# Patient Record
Sex: FEMALE | Race: BLACK OR AFRICAN AMERICAN | Employment: FULL TIME | URBAN - METROPOLITAN AREA
[De-identification: names, ages, dates, MRNs, and addresses within clinical notes are randomized per-mention and may not be internally consistent; named-entity substitution may affect disease eponyms.]

---

## 2019-03-28 ENCOUNTER — OFFICE VISIT (OUTPATIENT)
Dept: CARDIOLOGY CLINIC | Age: 29
End: 2019-03-28

## 2019-03-28 VITALS
OXYGEN SATURATION: 99 % | HEART RATE: 90 BPM | SYSTOLIC BLOOD PRESSURE: 148 MMHG | DIASTOLIC BLOOD PRESSURE: 88 MMHG | BODY MASS INDEX: 43.61 KG/M2 | HEIGHT: 62 IN | WEIGHT: 237 LBS

## 2019-03-28 DIAGNOSIS — E28.2 PCOS (POLYCYSTIC OVARIAN SYNDROME): ICD-10-CM

## 2019-03-28 DIAGNOSIS — E66.01 MORBID OBESITY WITH BMI OF 40.0-44.9, ADULT (HCC): ICD-10-CM

## 2019-03-28 DIAGNOSIS — R07.9 CHEST PAIN, UNSPECIFIED TYPE: Primary | ICD-10-CM

## 2019-03-28 DIAGNOSIS — K21.9 GASTROESOPHAGEAL REFLUX DISEASE, ESOPHAGITIS PRESENCE NOT SPECIFIED: ICD-10-CM

## 2019-03-28 RX ORDER — METFORMIN HYDROCHLORIDE 1000 MG/1
1000 TABLET ORAL DAILY
COMMUNITY

## 2019-03-28 RX ORDER — OMEPRAZOLE 20 MG/1
20 CAPSULE, DELAYED RELEASE ORAL DAILY
COMMUNITY
Start: 2019-03-28 | End: 2021-09-08

## 2019-03-28 RX ORDER — IBUPROFEN 400 MG/1
400 TABLET ORAL 2 TIMES DAILY
Qty: 14 TAB | Refills: 0 | COMMUNITY
Start: 2019-03-28 | End: 2021-09-08

## 2019-03-28 NOTE — PROGRESS NOTES
Visit Vitals  /88 (BP 1 Location: Right arm, BP Patient Position: Sitting)   Pulse 90   Ht 5' 2\" (1.575 m)   Wt 237 lb (107.5 kg)   SpO2 99%   BMI 43.35 kg/m²

## 2019-03-28 NOTE — PROGRESS NOTES
Richard Rajput     1990       Leander Pablo MD, [facility] [place]  Date of Visit-3/28/2019   PCP is No primary care provider on file. [facility] [place]  [facility]  HPI:  Richard Rajput is a 25 y.o. female   Pt presents for evaluation of CP. Pt reports that she has had CP and this onset about 1 month ago. She states her CP starts in her right clavicle and radiates to her left side. She notes that the pain was constant 7/10 yesterday. Currently, she states her pain is a 2/10. She adds exercising increases her pain. She denies recent increase in exercise intensity. She mentions she no longer exercises secondary to her symptoms. She mentions she takes metformin for PCOS. Pt denies smoking. She also denies recent hospital visits. Labs with Gyn? Denies edema, syncope or shortness of breath at rest, has no tachycardia, palpitations or sense of arrhythmia. EKG: NSR normal.     Assessment/Plan:     1. CP, focal to the third rib joint on the right beneath sternum. May be costo-chondritis. She also has some sensation of GERD. CAD seems unlikely given risk factors. Plan Prilosec OTC for 6 weeks and Motrin 400MG BID for 1 week. She will say if she feels better. If not, we will consider  stress echo. No future appointments. Patient Instructions   Please start Prilosec OTC for six weeks and motrin 400mg twice daily for seven days. Please call me at [phone] and let me know how you are feeling. Key CAD CHF Meds     Patient is on no cardiovascular meds. Impression:   1. Chest pain, unspecified type       Review of Systems   Constitutional: Negative for diaphoresis, fever and malaise/fatigue. HENT: Negative for ear pain, hearing loss, nosebleeds and tinnitus. Eyes: Negative for blurred vision, double vision and pain. Respiratory: Negative for cough, hemoptysis, sputum production, shortness of breath, wheezing and stridor.     Cardiovascular: Positive for chest pain. Negative for palpitations, orthopnea, claudication and leg swelling. Gastrointestinal: Negative for abdominal pain, blood in stool, constipation, diarrhea, heartburn, melena, nausea and vomiting. Genitourinary: Negative for dysuria, frequency and urgency. Musculoskeletal: Positive for back pain and joint pain. Negative for falls, myalgias and neck pain. Skin: Negative for rash. Neurological: Negative for dizziness, sensory change, seizures, loss of consciousness, weakness and headaches. Endo/Heme/Allergies: Does not bruise/bleed easily. Psychiatric/Behavioral: Negative for depression, hallucinations and memory loss. The patient is nervous/anxious. The patient does not have insomnia. see supplement sheet, initialed and to be scanned by staff    Body mass index is 43.35 kg/m². Past Medical History:   Diagnosis Date    Morbid [facility] with BMI of 40.0-44.9[facility]    PCOS (polycystic ovarian syndrome) 03/28/2019   no cath, blood transfusion or Gi ulcer  Social Hx=  non smoker, drinks 3-4 drinks a week, once a week caffein, works for [employer], lives with friend  Family hx- mother is 61 good health, father is 58 also in good health, denies any early CAD in family    Exam and Labs:  /88 (BP 1 Location: Right arm, BP Patient Position: Sitting)   Pulse 90   Ht 5' 2\" (1.575 m)   Wt 237 lb (107.5 kg)   SpO2 99%   BMI 43.35 kg/m² Constitutional:  NAD, comfortable  Head: NC,AT. Eyes: No scleral icterus. Neck:  Neck supple. No JVD present. Throat: moist mucous membranes. Chest: Effort normal & normal respiratory excursion . Neurological: alert, conversant and oriented . Skin: Skin is not cold. No obvious systemic rash noted. Not diaphoretic. No erythema. Psychiatric:  Grossly normal mood and affect. Behavior appears normal. Extremities:  no clubbing or cyanosis. Abdomen: non distended    Lungs:breath sounds normal. No stridor.  distress, wheezes or Rales.  Heart: Normal rate, regular rhythm, normal S1, S2, no murmurs, rubs, clicks or gallops , PMI non displaced. Edema: No edema present. Current Outpatient Medications   Medication Sig    metFORMIN (GLUCOPHAGE) 1,000 mg tablet Take 1,000 mg by mouth daily. No current facility-administered medications for this visit. Impression see above.       Written by Clayton Tinsley, as dictated by Kimberley Sadler MD.

## 2019-03-28 NOTE — PATIENT INSTRUCTIONS
Please start Prilosec OTC for six weeks and motrin 400mg twice daily for seven days. Please call me at [phone] and let me know how you are feeling.

## 2019-04-14 PROBLEM — K21.9 GASTROESOPHAGEAL REFLUX DISEASE: Status: ACTIVE | Noted: 2019-04-14

## 2019-04-14 PROBLEM — E66.01 MORBID OBESITY WITH BMI OF 40.0-44.9, ADULT (HCC): Status: ACTIVE | Noted: 2019-04-14

## 2019-04-14 PROBLEM — R07.9 CHEST PAIN: Status: ACTIVE | Noted: 2019-04-14

## 2019-04-14 PROBLEM — E28.2 PCOS (POLYCYSTIC OVARIAN SYNDROME): Status: ACTIVE | Noted: 2019-04-14

## 2020-09-25 ENCOUNTER — EMPLOYEE WELLNESS (OUTPATIENT)
Dept: OTHER | Facility: CLINIC | Age: 30
End: 2020-09-25

## 2020-09-25 LAB
CHOLEST SERPL-MCNC: 160 MG/DL
GLUCOSE SERPL-MCNC: 91 MG/DL (ref 65–100)
HDLC SERPL-MCNC: 54 MG/DL
LDLC SERPL CALC-MCNC: 88.6 MG/DL (ref 0–100)
TRIGL SERPL-MCNC: 87 MG/DL (ref ?–150)

## 2021-03-17 ENCOUNTER — APPOINTMENT (OUTPATIENT)
Dept: GENERAL RADIOLOGY | Age: 31
End: 2021-03-17
Attending: EMERGENCY MEDICINE
Payer: COMMERCIAL

## 2021-03-17 ENCOUNTER — HOSPITAL ENCOUNTER (EMERGENCY)
Age: 31
Discharge: HOME | End: 2021-03-17
Attending: EMERGENCY MEDICINE | Admitting: EMERGENCY MEDICINE
Payer: COMMERCIAL

## 2021-03-17 ENCOUNTER — NURSE TRIAGE (OUTPATIENT)
Dept: OTHER | Facility: CLINIC | Age: 31
End: 2021-03-17

## 2021-03-17 VITALS
HEART RATE: 86 BPM | BODY MASS INDEX: 44.3 KG/M2 | TEMPERATURE: 98.3 F | SYSTOLIC BLOOD PRESSURE: 144 MMHG | HEIGHT: 62 IN | DIASTOLIC BLOOD PRESSURE: 95 MMHG | OXYGEN SATURATION: 100 % | RESPIRATION RATE: 17 BRPM | WEIGHT: 240.74 LBS

## 2021-03-17 DIAGNOSIS — R09.1 PLEURISY: ICD-10-CM

## 2021-03-17 DIAGNOSIS — I10 ESSENTIAL HYPERTENSION: ICD-10-CM

## 2021-03-17 DIAGNOSIS — R07.89 ATYPICAL CHEST PAIN: Primary | ICD-10-CM

## 2021-03-17 LAB
ALBUMIN SERPL-MCNC: 3.8 G/DL (ref 3.5–5)
ALBUMIN/GLOB SERPL: 1 {RATIO} (ref 1.1–2.2)
ALP SERPL-CCNC: 91 U/L (ref 45–117)
ALT SERPL-CCNC: 16 U/L (ref 12–78)
ANION GAP SERPL CALC-SCNC: 9 MMOL/L (ref 5–15)
APTT PPP: 22.5 SEC (ref 22.1–31)
AST SERPL-CCNC: 10 U/L (ref 15–37)
BASOPHILS # BLD: 0 K/UL (ref 0–0.1)
BASOPHILS NFR BLD: 0 % (ref 0–1)
BILIRUB SERPL-MCNC: 0.3 MG/DL (ref 0.2–1)
BNP SERPL-MCNC: 29 PG/ML (ref 0–125)
BUN SERPL-MCNC: 15 MG/DL (ref 6–20)
BUN/CREAT SERPL: 17 (ref 12–20)
CALCIUM SERPL-MCNC: 9.3 MG/DL (ref 8.5–10.1)
CHLORIDE SERPL-SCNC: 101 MMOL/L (ref 97–108)
CK SERPL-CCNC: 87 U/L (ref 26–192)
CO2 SERPL-SCNC: 26 MMOL/L (ref 21–32)
CREAT SERPL-MCNC: 0.89 MG/DL (ref 0.55–1.02)
D DIMER PPP FEU-MCNC: 0.37 MG/L FEU (ref 0–0.65)
DIFFERENTIAL METHOD BLD: ABNORMAL
EOSINOPHIL # BLD: 0.1 K/UL (ref 0–0.4)
EOSINOPHIL NFR BLD: 1 % (ref 0–7)
ERYTHROCYTE [DISTWIDTH] IN BLOOD BY AUTOMATED COUNT: 13.5 % (ref 11.5–14.5)
GLOBULIN SER CALC-MCNC: 3.9 G/DL (ref 2–4)
GLUCOSE SERPL-MCNC: 92 MG/DL (ref 65–100)
HCT VFR BLD AUTO: 39.2 % (ref 35–47)
HGB BLD-MCNC: 12.7 G/DL (ref 11.5–16)
IMM GRANULOCYTES # BLD AUTO: 0 K/UL
IMM GRANULOCYTES NFR BLD AUTO: 0 %
INR PPP: 1 (ref 0.9–1.1)
LYMPHOCYTES # BLD: 4.3 K/UL (ref 0.8–3.5)
LYMPHOCYTES NFR BLD: 38 % (ref 12–49)
MCH RBC QN AUTO: 29.1 PG (ref 26–34)
MCHC RBC AUTO-ENTMCNC: 32.4 G/DL (ref 30–36.5)
MCV RBC AUTO: 89.9 FL (ref 80–99)
MONOCYTES # BLD: 0.7 K/UL (ref 0–1)
MONOCYTES NFR BLD: 6 % (ref 5–13)
NEUTS SEG # BLD: 6.3 K/UL (ref 1.8–8)
NEUTS SEG NFR BLD: 55 % (ref 32–75)
NRBC # BLD: 0 K/UL (ref 0–0.01)
NRBC BLD-RTO: 0 PER 100 WBC
PLATELET # BLD AUTO: 289 K/UL (ref 150–400)
PMV BLD AUTO: 9.4 FL (ref 8.9–12.9)
POTASSIUM SERPL-SCNC: 3.7 MMOL/L (ref 3.5–5.1)
PROT SERPL-MCNC: 7.7 G/DL (ref 6.4–8.2)
PROTHROMBIN TIME: 9.9 SEC (ref 9–11.1)
RBC # BLD AUTO: 4.36 M/UL (ref 3.8–5.2)
RBC MORPH BLD: ABNORMAL
SODIUM SERPL-SCNC: 136 MMOL/L (ref 136–145)
THERAPEUTIC RANGE,PTTT: NORMAL SECS (ref 58–77)
TROPONIN I SERPL-MCNC: <0.05 NG/ML
TSH SERPL DL<=0.05 MIU/L-ACNC: 2.71 UIU/ML (ref 0.36–3.74)
WBC # BLD AUTO: 11.4 K/UL (ref 3.6–11)

## 2021-03-17 PROCEDURE — 85379 FIBRIN DEGRADATION QUANT: CPT

## 2021-03-17 PROCEDURE — 36415 COLL VENOUS BLD VENIPUNCTURE: CPT

## 2021-03-17 PROCEDURE — 85025 COMPLETE CBC W/AUTO DIFF WBC: CPT

## 2021-03-17 PROCEDURE — 85610 PROTHROMBIN TIME: CPT

## 2021-03-17 PROCEDURE — 83880 ASSAY OF NATRIURETIC PEPTIDE: CPT

## 2021-03-17 PROCEDURE — 71045 X-RAY EXAM CHEST 1 VIEW: CPT

## 2021-03-17 PROCEDURE — 82550 ASSAY OF CK (CPK): CPT

## 2021-03-17 PROCEDURE — 85730 THROMBOPLASTIN TIME PARTIAL: CPT

## 2021-03-17 PROCEDURE — 84443 ASSAY THYROID STIM HORMONE: CPT

## 2021-03-17 PROCEDURE — 84484 ASSAY OF TROPONIN QUANT: CPT

## 2021-03-17 PROCEDURE — 99285 EMERGENCY DEPT VISIT HI MDM: CPT

## 2021-03-17 PROCEDURE — 74011250637 HC RX REV CODE- 250/637: Performed by: EMERGENCY MEDICINE

## 2021-03-17 PROCEDURE — 80053 COMPREHEN METABOLIC PANEL: CPT

## 2021-03-17 PROCEDURE — 93005 ELECTROCARDIOGRAM TRACING: CPT

## 2021-03-17 RX ORDER — ASPIRIN 325 MG
325 TABLET ORAL ONCE
Status: COMPLETED | OUTPATIENT
Start: 2021-03-17 | End: 2021-03-17

## 2021-03-17 RX ORDER — AMLODIPINE BESYLATE 10 MG/1
10 TABLET ORAL DAILY
Qty: 20 TAB | Refills: 0 | Status: SHIPPED | OUTPATIENT
Start: 2021-03-17 | End: 2021-04-06

## 2021-03-17 RX ADMIN — ASPIRIN 325 MG: 325 TABLET, FILM COATED ORAL at 19:46

## 2021-03-17 NOTE — ED NOTES
Bedside shift change report given to Humberto Joe RN (oncoming nurse) by Iván Smith RN (offgoing nurse). Report included the following information SBAR, Kardex, ED Summary, MAR, Recent Results and Cardiac Rhythm NSR.

## 2021-03-17 NOTE — ED PROVIDER NOTES
The patient is a 66-year-old female with a past medical history significant for PCOS, morbid obesity who presents to the ED with accompanied the midsternal chest.  Described as sharp and stabbing in nature, severity 5 out of 10, intermittent lasting few minutes at a time, without any aggravating or relieving factor nonradiating. The patient denies any fever, sore throat, cough or congestion, headache, neck and back pain, chest pain or shortness of breath exertion, nausea, vomiting, diarrhea, constipation, dysuria, vaginal discharge or bleeding, extremity weakness or numbness, sick contact, skin rash, recent travel, prior history of the same. The patient is currently on oral contraceptive. Past Medical History:   Diagnosis Date    Morbid obesity with BMI of 40.0-44.9, [facility]    PCOS (polycystic ovarian syndrome) 03/28/2019       Past Surgical History:   Procedure Laterality Date    HX HERNIA REPAIR           History reviewed. No pertinent family history.     Social History     Socioeconomic History    Marital status: SINGLE     Spouse name: Not on file    Number of children: Not on file    Years of education: Not on file    Highest education level: Not on file   Occupational History    Not on file   Social Needs    Financial resource strain: Not on file    Food insecurity     Worry: Not on file     Inability: Not on file    Transportation needs     Medical: Not on file     Non-medical: Not on file   Tobacco Use    Smoking status: Never Smoker    Smokeless tobacco: Never Used   Substance and Sexual Activity    Alcohol use: Yes     Comment: occ    Drug use: No    Sexual activity: Not on file   Lifestyle    Physical activity     Days per week: Not on file     Minutes per session: Not on file    Stress: Not on file   Relationships    Social connections     Talks on phone: Not on file     Gets together: Not on file     Attends Christianity service: Not on file     Active member of club or organization: Not on file     Attends meetings of clubs or organizations: Not on file     Relationship status: Not on file    Intimate partner violence     Fear of current or ex partner: Not on file     Emotionally abused: Not on file     Physically abused: Not on file     Forced sexual activity: Not on file   Other Topics Concern    Not on file   Social History Narrative    ** Merged History Encounter **              ALLERGIES: Patient has no known allergies. Review of Systems   All other systems reviewed and are negative. Vitals:    03/17/21 1930   BP: (!) 167/107   Pulse: 100   Resp: 18   Temp: 98.3 °F (36.8 °C)   SpO2: 100%   Weight: 109.2 kg (240 lb 11.9 oz)   Height: 5' 2\" (1.575 m)            Physical Exam  Vitals signs and nursing note reviewed. CONSTITUTIONAL: Well-appearing; well-nourished; in no apparent distress  HEAD: Normocephalic; atraumatic  EYES: PERRL; EOM intact; conjunctiva and sclera are clear bilaterally. ENT: No rhinorrhea; normal pharynx with no tonsillar hypertrophy; mucous membranes pink/moist, no erythema, no exudate. NECK: Supple; non-tender; no cervical lymphadenopathy  CARD: Normal S1, S2; no murmurs, rubs, or gallops. Regular rate and rhythm. RESP: Normal respiratory effort; breath sounds clear and equal bilaterally; no wheezes, rhonchi, or rales. ABD: Normal bowel sounds; non-distended; non-tender; no palpable organomegaly, no masses, no bruits. Back Exam: Normal inspection; no vertebral point tenderness, no CVA tenderness. Normal range of motion. EXT: Normal ROM in all four extremities; non-tender to palpation; no swelling or deformity; distal pulses are normal, no edema. SKIN: Warm; dry; no rash.   NEURO:Alert and oriented x 3, coherent, SEBASTIEN-XII grossly intact, sensory and motor are non-focal.      MDM  Number of Diagnoses or Management Options  Diagnosis management comments: Assessment: 33-year-old female who presents to the ED for evaluation for chest pain. The patient is currently asymptomatic and hemodynamically stable with her blood pressure is mildly elevated. She is resting in bed comfortably. Differential diagnosis include pleurisy/GERD/ACS/VTE-with electrolyte of the body, anemia and dehydration. Plan: Lab/EKG chest x-ray/IV fluid/aspirin/serial exam/education, reassurance, symptomatic treatment/ Monitor and Reevaluate. Amount and/or Complexity of Data Reviewed  Clinical lab tests: ordered and reviewed  Tests in the radiology section of CPT®: ordered and reviewed  Tests in the medicine section of CPT®: ordered and reviewed  Discussion of test results with the performing providers: yes  Decide to obtain previous medical records or to obtain history from someone other than the patient: yes  Obtain history from someone other than the patient: yes  Review and summarize past medical records: yes  Discuss the patient with other providers: yes  Independent visualization of images, tracings, or specimens: yes    Risk of Complications, Morbidity, and/or Mortality  Presenting problems: moderate  Diagnostic procedures: moderate  Management options: moderate    Patient Progress  Patient progress: stable         Procedures    ED EKG interpretation:  Rhythm: normal sinus rhythm; and regular . Rate (approx.): 96; Axis: normal; P wave: normal; QRS interval: normal ; ST/T wave: normal; in  Lead: Diffusely; Other findings: borderline ekg. This EKG was interpreted by Ellen García MD,ED Provider. XRAY INTERPRETATION (ED MD)  Chest Xray  No acute process seen. Normal heart size. No bony abnormalities. No infiltrate. Navdeep Hughes MD 7:43 PM    Progress Note:   Pt has been reexamined by Ellen García MD. Pt is feeling much better. Symptoms have improved. All available results have been reviewed with pt and any available family. Pt understands sx, dx, and tx in ED. Care plan has been outlined and questions have been answered. Pt is ready to go home. Will send home on chest pain/pleurisy and hypertension instruction. Prescription of Norvasc. . Outpatient referral with PCP as needed. Written by Fannie Koehler MD,10:35 PM    .   .

## 2021-03-17 NOTE — ED TRIAGE NOTES
Pt reports intermittent mid sternal chest pain since 2pm today that started when she was sitting and doing school work at her computer. Pt reports taking TUMs prior to coming to ED. Pt denies SOB with CP.

## 2021-03-17 NOTE — Clinical Note
[address] 
[facility] 
[address] [address] [phone] Work/School Note Date: 3/17/2021 To Whom It May concern: 
 
Donnie Smith was seen and treated today in the emergency room by the following provider(s): 
Attending Provider: Rivas Ho MD. Donnie Smith is excused from work/school on 03/17/21 and 03/18/21. She is medically clear to return to work/school on 3/19/2021. Sincerely, Guera Garsia MD

## 2021-03-18 LAB
ATRIAL RATE: 96 BPM
CALCULATED P AXIS, ECG09: 60 DEGREES
CALCULATED R AXIS, ECG10: 28 DEGREES
CALCULATED T AXIS, ECG11: 44 DEGREES
DIAGNOSIS, 93000: NORMAL
P-R INTERVAL, ECG05: 130 MS
Q-T INTERVAL, ECG07: 340 MS
QRS DURATION, ECG06: 78 MS
QTC CALCULATION (BEZET), ECG08: 429 MS
VENTRICULAR RATE, ECG03: 96 BPM

## 2021-03-18 NOTE — TELEPHONE ENCOUNTER
Brief description of triage: chest pain    Mid sternal radiates to left under breast  8/10 sharp, fingertips tingling right  Onset 1300 progressively getting worse  +headache  Denies hx og CAD, blood clots,   controlled HTN no meds  See below assessment      Triage indicates for patient to go to ED    Care advice provided, patient verbalizes understanding; denies any other questions or concerns; instructed to call back for any new or worsening symptoms. Attention Provider: Thank you for allowing me to participate in the care of your patient. The patient was connected to triage in response to symptoms provided. Please do not respond through this encounter as the response is not directed to a shared pool. Reason for Disposition   [1] Chest pain lasts > 5 minutes AND [2] occurred in past 3 days (72 hours)    Answer Assessment - Initial Assessment Questions  1. LOCATION: \"Where does it hurt? \"    Mid sternal radiates to left    2. RADIATION: \"Does the pain go anywhere else? \" (e.g., into neck, jaw, arms, back)      To left chest    3. ONSET: \"When did the chest pain begin? \" (Minutes, hours or days)       1300 today    4. PATTERN \"Does the pain come and go, or has it been constant since it started? \"  \"Does it get worse with exertion? \"      Constant    5. DURATION: \"How long does it last\" (e.g., seconds, minutes, hours)  constant    6. SEVERITY: \"How bad is the pain? \"  (e.g., Scale 1-10; mild, moderate, or severe)   8/10     - SEVERE (8-10): excruciating pain, unable to do any normal activities         7. CARDIAC RISK FACTORS: \"Do you have any history of heart problems or risk factors for heart disease? \" (e.g., angina, prior heart attack; diabetes, high blood pressure, high cholesterol, smoker, or strong family history of heart disease)  HTN, controlled, no meds    8. PULMONARY RISK FACTORS: \"Do you have any history of lung disease? \"  (e.g., blood clots in lung, asthma, emphysema, birth control pills) denies    9. CAUSE: \"What do you think is causing the chest pain? \"    Unknown    10. OTHER SYMPTOMS: \"Do you have any other symptoms? \" (e.g., dizziness, nausea, vomiting, sweating, fever, difficulty breathing, cough)    Headache, tingling right fingers    11. PREGNANCY: \"Is there any chance you are pregnant? \" \"When was your last menstrual period? \"     denies    Protocols used: CHEST PAIN-ADULT-AH

## 2021-07-27 LAB
CHOLEST SERPL-MCNC: 189 MG/DL
GLUCOSE SERPL-MCNC: 83 MG/DL (ref 65–100)
HDLC SERPL-MCNC: 65 MG/DL
LDLC SERPL CALC-MCNC: 108.8 MG/DL (ref 0–100)
TRIGL SERPL-MCNC: 76 MG/DL (ref ?–150)

## 2021-09-08 ENCOUNTER — OFFICE VISIT (OUTPATIENT)
Dept: ENDOCRINOLOGY | Age: 31
End: 2021-09-08
Payer: COMMERCIAL

## 2021-09-08 VITALS
BODY MASS INDEX: 45.27 KG/M2 | DIASTOLIC BLOOD PRESSURE: 89 MMHG | WEIGHT: 246 LBS | SYSTOLIC BLOOD PRESSURE: 127 MMHG | HEIGHT: 62 IN | HEART RATE: 84 BPM

## 2021-09-08 DIAGNOSIS — L68.0 HIRSUTISM: ICD-10-CM

## 2021-09-08 DIAGNOSIS — E66.01 OBESITY, CLASS III, BMI 40-49.9 (MORBID OBESITY) (HCC): ICD-10-CM

## 2021-09-08 DIAGNOSIS — E28.2 PCOS (POLYCYSTIC OVARIAN SYNDROME): Primary | ICD-10-CM

## 2021-09-08 PROCEDURE — 99245 OFF/OP CONSLTJ NEW/EST HI 55: CPT | Performed by: INTERNAL MEDICINE

## 2021-09-08 RX ORDER — AMLODIPINE BESYLATE 10 MG/1
10 TABLET ORAL DAILY
COMMUNITY

## 2021-09-08 NOTE — PROGRESS NOTES
Chief Complaint   Patient presents with    PCOS     pcp and pharmacy verified     History of Present Illness: Asiya Alonzo is a 33 y.o. female who I was asked to see in consult by Dr. Gilda Patel for evaluation of PCOS and hirsutism. Will request records from Dr. Gilda Ptael. \"Dr. Gilda Patel has been trying to control my PCOS with medications, but things have not been going well. \"  She was diagnosed with PCOS \"when I had a cyst rupture and I almost , that was in . \"    For her PCOS she is taking Spironolactone \"I do not know how much\", Metformin 1000mg once per day and OCP. \"I do not know which OCP I am taking, but I just take a regular monthly dose pack. \"  She has been on the Metformin \"since I was diagnosed with PCOS\". She does not test her blood sugars    Her LMP was 21. She notes that for the last 4 months, since she received her [address] vaccination, her cycle has been come very regular. Pt notes she has been experiencing irregular menstrual cycles for years, despite being on OCP since the age of 23. Pt notes that she has never been pregnant in the past. She has not \"tried and been unsuccessful\", but she has never tried to get pregnant in the past.    She reports that she has hx of hirsutism. She has been using waxing, tweezing and \"I have been doing laser surgery\". She notes that on the Spironolactone this has helped to keep the excess hair growth down. She notes that when she stopped the Spironolactone in  the hair growth did increase and she restarted. She does noted excess hair on her chin, neck and abdomen (linea alba). She denies hirsutism on her back, thighs or arms. She denies abdominal striae. Pt notes that \"I have tried KETO, I have been cutting out different things from my diet, I have tried dieting, intermittent fasting and nothing seems to help. I can not seem to lose weight. Her weight today was 246 pounds. She notes her max weight was 250 pounds.    She recalls that at the age of 14 her weight was in the 150s range. She notes her weight began to increase during college and continued to increase after college. She does recall using Contrave in the past. She recalls it did help but \"I was not in the right mind space at the time. It suppressed my appetite, but it also suppressed my libido so I stopped taking it. \"    Pt has TSH level drawn in March 2021 of 2.71. Pt has hx of HTN and is treated with Norvasc 10mg daily. No known family hx of PCOS or infertility in the women of her family. Her MAunt had DM. Past Medical History:   Diagnosis Date    Hypertension     genetic    [facility] 40.0[facility]44.[facility]    PCOS (polycystic ovarian syndrome) 03/28/2019     Past Surgical History:   Procedure Laterality Date    HX HERNIA REPAIR      as an infant     Current Outpatient Medications   Medication Sig    amLODIPine (NORVASC) 10 mg tablet Take 10 mg by mouth daily.  SPIRONOLACTONE PO Take  by mouth daily. Unsure of strength    OTHER BCP    metFORMIN (GLUCOPHAGE) 1,000 mg tablet Take 1,000 mg by mouth daily. No current facility-administered medications for this visit.      No Known Allergies  Family History   Problem Relation Age of Onset    Hypertension Mother     Thyroid Disease Mother     Liver Disease Mother         hepatitis    Cancer Father         breast and prostate    Hypertension Father     Crohn's Disease Father     No Known Problems Sister     No Known Problems Brother         ADOPTED    Liver Disease Maternal Grandmother         Hepatitis    Cancer Maternal Grandmother         Breast    Heart defect Maternal Grandfather         aortic aneursym    Hypertension Maternal Grandfather     Other Paternal Grandmother         brain aneurysm    Hypertension Paternal Grandmother     No Known Problems Paternal Grandfather     Cancer Maternal Aunt         Breast    Diabetes Maternal Aunt      Social History     Socioeconomic History  Marital status: SINGLE     Spouse name: Not on file    Number of children: Not on file    Years of education: Not on file    Highest education level: Not on file   Occupational History    Not on file   Tobacco Use    Smoking status: Never Smoker    Smokeless tobacco: Never Used   Substance and Sexual Activity    Alcohol use: Yes     Comment: weekly    Drug use: No    Sexual activity: Not on file   Other Topics Concern    Not on file   Social History Narrative    ** Merged History Encounter **          Social Determinants of Health     Financial Resource Strain:     Difficulty of Paying Living Expenses:    Food Insecurity:     Worried About Running Out of Food in the Last Year:     [address] in the Last Year:    Transportation Needs:     Lack of Transportation (Medical):  Lack of Transportation (Non-Medical):    Physical Activity:     Days of Exercise per Week:     Minutes of Exercise per Session:    Stress:     Feeling of Stress :    Social Connections:     Frequency of Communication with Friends and Family:     Frequency of Social Gatherings with Friends and Family:     Attends Gnosticism Services:     Active Member of Clubs or Organizations:     Attends Club or Organization Meetings:     Marital Status:    Intimate Partner Violence:     Fear of Current or Ex-Partner:     Emotionally Abused:     Physically Abused:     Sexually Abused:      Review of Systems:  Negative, except as noted in HPI  Physical Examination:  Blood pressure 127/89, pulse 84, height 5' 2\" (1.575 m), weight 246 lb (111.6 kg).   - General: pleasant, no distress, good eye contact  - HEENT: no exopthalmos, no periorbital edema, no scleral/conjunctival injection, EOMI, no lid lag or stare  - Neck: supple, no thyromegaly, masses, lymph nodes, or carotid bruits, no supraclavicular or dorsocervical fat pads  - Cardiovascular: regular, normal rate, normal S1 and S2, no murmurs/rubs/gallops, 2+ dorsalis pedis pulses bilaterally  - Respiratory: clear to auscultation bilaterally  - Gastrointestinal: soft, nontender, nondistended, no masses, no hepatosplenomegaly  - Musculoskeletal: no proximal muscle weakness in upper or lower extremities  - Integumentary: no acanthosis nigricans, no rashes, no edema, no abdominal striae. There is hair \"stubble\" in the linea alba and around her navel. - Neurological: reflexes 2+ at biceps, no tremor  - Psychiatric: normal mood and affect    Data Reviewed:   Component      Latest Ref Rng & Units 7/27/2021 3/17/2021           6:00 AM  7:32 PM   Glucose      65 - 100 mg/dL 83    Cholesterol, total      <200 MG/    HDL Cholesterol      MG/DL 65    LDL, calculated      0 - 100 MG/.8 (H)    Triglyceride      <150 MG/DL 76    TSH      0.36 - 3.74 uIU/mL  2.71     Component      Latest Ref Rng & Units 3/17/2021 3/17/2021           7:32 PM  7:32 PM   WBC      3.6 - 11.0 K/uL 11.4 (H)    RBC      3.80 - 5.20 M/uL 4.36    HGB      11.5 - 16.0 g/dL 12.7    HCT      35.0 - 47.0 % 39.2    MCV      80.0 - 99.0 FL 89.9    MCH      26.0 - 34.0 PG 29.1    MCHC      30.0 - 36.5 g/dL 32.4    RDW      11.5 - 14.5 % 13.5    PLATELET      237 - 774 K/uL 289    MPV      8.9 - 12.9 FL 9.4    NRBC      0  WBC 0.0    ABSOLUTE NRBC      0.00 - 0.01 K/uL 0.00    NEUTROPHILS      32 - 75 % 55    LYMPHOCYTES      12 - 49 % 38    MONOCYTES      5 - 13 % 6    EOSINOPHILS      0 - 7 % 1    BASOPHILS      0 - 1 % 0    IMMATURE GRANULOCYTES      % 0    ABS. NEUTROPHILS      1.8 - 8.0 K/UL 6.3    ABS. LYMPHOCYTES      0.8 - 3.5 K/UL 4.3 (H)    ABS. MONOCYTES      0.0 - 1.0 K/UL 0.7    ABS. EOSINOPHILS      0.0 - 0.4 K/UL 0.1    ABS. BASOPHILS      0.0 - 0.1 K/UL 0.0    ABS. IMM.  GRANS.      K/UL 0.0    DF       MANUAL    RBC COMMENTS       NORMOCYTIC, NORMOCHROMIC    Sodium      136 - 145 mmol/L  136   Potassium      3.5 - 5.1 mmol/L  3.7   Chloride      97 - 108 mmol/L  101   CO2      21 - 32 mmol/L  26 Anion gap      5 - 15 mmol/L  9   Glucose      65 - 100 mg/dL  92   BUN      6 - 20 MG/DL  15   Creatinine      0.55 - 1.02 MG/DL  0.89   BUN/Creatinine ratio      12 - 20    17   GFR est AA      >60 ml/min/1.73m2  >60   GFR est non-AA      >60 ml/min/1.73m2  >60   Calcium      8.5 - 10.1 MG/DL  9.3   Bilirubin, total      0.2 - 1.0 MG/DL  0.3   ALT      12 - 78 U/L  16   AST      15 - 37 U/L  10 (L)   Alk. phosphatase      45 - 117 U/L  91   Protein, total      6.4 - 8.2 g/dL  7.7   Albumin      3.5 - 5.0 g/dL  3.8   Globulin      2.0 - 4.0 g/dL  3.9   A-G Ratio      1.1 - 2.2    1.0 (L)     Assessment/Plan:   1. PCOS (polycystic ovarian syndrome)    2. Hirsutism    3. Obesity, Class III, BMI 40-49.9 (morbid obesity) ([facility]    1) We discussed at length PCOS and the long term effects (metabolic and reproductive). Will order an A1C, Testosterone, DHEA-S and 17-OH progesterone and CMP. We discussed increasing her Spironolactone dose if her testosterone is still elevated and her potassium is not high. We also discussed increasing her Metformin to 1000mg BID, based on the results of her A1C. Pt to continue the OCP from Dr. Javier Javed. 2) See #1. 3) Pt has attempted many diets and lifestyle changes with little to no help in her continued weight gain. She had normal TSH in March 2021, she has no signs or symptoms concerning for Acromegaly or Cushing's. Discussed various weight loss medications - phentermine, Qsymia, Contrave, Wegovy and Saxenda with consideration of effectiveness, costs and side effects   Pt to call her insurance and ask about \"weight loss riders\" so we know which medication we can try her on to help with weight loss. Pt voices understanding and agreement with the plan. RTC 4 months      Patient Instructions   Phentermine (By mouth)   Phentermine (FEN-ter-meen)  Helps you lose weight when used for a short time. Brand Name(s):  Adipex-P, Lomaira   There may be other brand names for this medicine. When This Medicine Should Not Be Used: This medicine is not right for everyone. Do not use it if you had an allergic reaction to phentermine or similar medicines, or if you are pregnant. How to Use This Medicine:   Dissolving Tablet, Capsule, Long Acting Capsule, Tablet, Dissolving Tablet  · Your doctor will tell you how much medicine to use. Do not use more than directed. · This medicine is not for long-term use. · To avoid trouble sleeping, always take this medicine in the morning and never at bedtime or late in the evening. ¨ Take the capsule 2 hours after breakfast.  ¨ Take the extended-release capsule before breakfast.  ¨ Take the disintegrating tablet in the morning, with or without food. ¨ Take the phentermine tablet before breakfast or 1 to 2 hours after breakfast.  ¨ Take Lomaira tablet 30 minutes before meals. · Swallow the extended-release capsule whole. Do not crush, break, or chew it. · If you are using the disintegrating tablet, make sure your hands are dry before you handle the tablet. Place the tablet on your tongue. It should melt quickly. After the tablet has melted, swallow or take a drink of water. · Tablet: Swallow whole. Do not crush, break, or chew it. · Carefully follow your doctor's instructions about any special diet. · Missed dose: Take a dose as soon as you remember. If it is almost time for your next dose, wait until then and take a regular dose. Do not take extra medicine to make up for a missed dose. · Store the medicine in a closed container at room temperature, away from heat, moisture, and direct light. Drugs and Foods to Avoid:   Ask your doctor or pharmacist before using any other medicine, including over-the-counter medicines, vitamins, and herbal products. · Do not use this medicine and an MAO inhibitor (MAOI) within 14 days of each other. · Some medicines can affect how phentermine works.  Tell your doctor if you are using any of the following:  ¨ Amphetamine medicine (including dextroamphetamine, methamphetamine)  ¨ Diet pills  ¨ Insulin or diabetes medicine  ¨ Medicine to treat depression (including fluoxetine, fluvoxamine, paroxetine, sertraline)  · Do not drink alcohol while you are using this medicine. Warnings While Using This Medicine:   · It is not safe to take this medicine during pregnancy. It could harm an unborn baby. Tell your doctor right away if you become pregnant. · Tell your doctor if you are breastfeeding, or if you have kidney disease, diabetes, glaucoma, congestive heart failure, heart or blood vessel disease, high blood pressure, overactive thyroid, or a history of stroke or drug abuse. Tell your doctor if have allergies to aspirin or tartrazine. · This medicine may cause the following problems:  ¨ Primary pulmonary hypertension (a serious lung problem)  ¨ Heart valve disease  ¨ Changes in blood sugar levels  · This medicine may make you dizzy or drowsy. Do not drive or do anything else that could be dangerous until you know how this medicine affects you. · This medicine can be habit-forming. Do not use more than your prescribed dose. Call your doctor if you think your medicine is not working. · Do not stop using this medicine suddenly. Your doctor will need to slowly decrease your dose before you stop it completely. · Your doctor will check your progress and the effects of this medicine at regular visits. Keep all appointments. · Keep all medicine out of the reach of children. Never share your medicine with anyone.   Possible Side Effects While Using This Medicine:   Call your doctor right away if you notice any of these side effects:  · Allergic reaction: Itching or hives, swelling in your face or hands, swelling or tingling in your mouth or throat, chest tightness, trouble breathing  · Chest pain, fainting, trouble breathing  · Fast, slow, pounding, or uneven heartbeat  · Seizures or tremors  · Severe headache  · Swelling of your feet or lower legs  If you notice these less serious side effects, talk with your doctor:   · Changes in sex drive  · Dizziness, drowsiness, mild headache  · Dry mouth or a bad taste in your mouth  · Nausea, vomiting, diarrhea, constipation, stomach cramps  · Restlessness or nervousness, trouble sleeping  If you notice other side effects that you think are caused by this medicine, tell your doctor. Call your doctor for medical advice about side effects. You may report side effects to FDA at 7-609-FWK-7371  © 2017 [facility] Information is for End User's use only and may not be sold, redistributed or otherwise used for commercial purposes. The above information is an  only. It is not intended as medical advice for individual conditions or treatments. Talk to your doctor, nurse or pharmacist before following any medical regimen to see if it is safe and effective for you. Topiramate (By mouth)   Topiramate (toe-PIR-a-mate)  Treats and prevents seizures, and helps prevent migraine headaches. Brand Name(s): Qudexy XR, Topamax, Trokendi XR   There may be other brand names for this medicine. When This Medicine Should Not Be Used: This medicine is not right for everyone. Do not use it if you had an allergic reaction to topiramate, or if you are pregnant. How to Use This Medicine:   Capsule, Long Acting Capsule, Tablet  · Take your medicine as directed. Your dose may need to be changed several times to find what works best for you. · Tablet: Swallow whole. Do not break, crush, or chew the tablet. It has a very bitter taste. · Capsule or extended-release capsule: Do not crush or chew the capsule. Swallow whole or open the capsule and pour the medicine into a small amount (1 teaspoon) of soft food, such as applesauce. Swallow the mixture right away without chewing. Do not store the mixture for use at a later time.   · Drink extra fluids so you will urinate more often and help prevent kidney problems. · This medicine should come with a Medication Guide. Ask your pharmacist for a copy if you do not have one. · Missed dose: Take a dose as soon as you remember. If it is almost time for your next dose, wait until then and take a regular dose. Do not take extra medicine to make up for a missed dose. If you miss a dose or forget to use your medicine, use it as soon as you can. If your next regular dose of Topamax® is less than 6 hours away, wait until then to use the medicine and skip the missed dose. If you miss more than 1 dose of Topamax®, call your doctor for instructions. · Store the medicine in a closed container at room temperature, away from heat, moisture, and direct light. Drugs and Foods to Avoid:   Ask your doctor or pharmacist before using any other medicine, including over-the-counter medicines, vitamins, and herbal products. · Do not drink alcohol with Qudexy XR or Topamax®. Do not drink alcohol for 6 hours before and 6 hours after you take the Trokendi XR capsule. · Some medicines can affect how topiramate works. Tell your doctor if you are using acetazolamide, dichlorphenamide, dichloralphenazone, digoxin, lithium, metformin, zonisamide, other medicine for seizures (such as carbamazepine, phenytoin, valproic acid), or birth control pills. · Tell your doctor if you are using any medicine that makes you sleepy, such as allergy medicine or narcotic pain medicine. Warnings While Using This Medicine:   · It is not safe to take this medicine during pregnancy. It could harm an unborn baby. Tell your doctor right away if you become pregnant. · Tell your doctor if you are breastfeeding, or if you have kidney disease, liver disease, glaucoma, lung or breathing problems, osteoporosis, or a history of depression or mood disorders. Tell your doctor if you are on a ketogenic diet (high in fat and low in carbohydrates).   · This medicine may cause the following problems:  ¨ Eye pain or vision changes, including glaucoma  ¨ Changes in body temperature  ¨ Metabolic acidosis (too much acid in the blood)  ¨ Kidney stones  · This medicine may increase depression or thoughts of suicide. Tell your doctor right away if you start to feel more depressed or think about hurting yourself. · This medicine may make you dizzy, drowsy, or tired. Do not drive or do anything else that could be dangerous until you know how this medicine affects you. · Do not stop using this medicine suddenly. Your doctor will need to slowly decrease your dose before you stop it completely. · Your doctor will do lab tests at regular visits to check on the effects of this medicine. Keep all appointments. · Keep all medicine out of the reach of children. Never share your medicine with anyone. Possible Side Effects While Using This Medicine:   Call your doctor right away if you notice any of these side effects:  · Allergic reaction: Itching or hives, swelling in your face or hands, swelling or tingling in your mouth or throat, chest tightness, trouble breathing  · Bloody or cloudy urine, painful urination, sudden lower back or stomach pain  · Changes in vision, eye pain  · Confusion, problems with walking, clumsiness, dizziness, or trouble talking, concentrating, or remembering  · Feeling agitated, depressed, nervous, or irritable, thoughts of hurting yourself or others, unusual mood or behavior  · Fever, decreased sweating  · Numbness, tingling, or burning pain in your hands, arms, legs, or feet  · Rapid, deep breathing, loss of appetite, fast or uneven heartbeat  · Vomiting, unusual drowsiness, tiredness, or weakness  If you notice these less serious side effects, talk with your doctor:   · Change in taste  · Nausea, diarrhea  · Stuffy or runny nose  · Weight loss  If you notice other side effects that you think are caused by this medicine, tell your doctor.    Call your doctor for medical advice about side effects. You may report side effects to FDA at 3-578-FDA-2519  © 2017 [facility] Information is for End User's use only and may not be sold, redistributed or otherwise used for commercial purposes. The above information is an  only. It is not intended as medical advice for individual conditions or treatments. Talk to your doctor, nurse or pharmacist before following any medical regimen to see if it is safe and effective for you. Naltrexone/Bupropion (By mouth)   Bupropion Hydrochloride (wcp-TJXV-iwf-on ashlyn-droe-KLOR-riccardo), Naltrexone Hydrochloride (nal-TREX-one ashlyn-droe-KLOR-riccardo)  Used with diet and exercise to help you lose weight. Brand Name(s): Contrave   There may be other brand names for this medicine. When This Medicine Should Not Be Used: This medicine is not right for everyone. Do not use it if you had an allergic reaction to naltrexone or bupropion, you are pregnant, or you have seizures, anorexia, or bulimia. How to Use This Medicine:   Long Acting Tablet  · Take your medicine as directed. Your dose may need to be changed several times to find what works best for you. · Swallow the extended-release tablet whole. Do not crush, break, or chew it. · It is best to take this medicine with food or milk. However, do not take this medicine with high-fat meals. This may increase your risk of seizures. · This medicine should come with a Medication Guide. Ask your pharmacist for a copy if you do not have one. · Missed dose: Skip the missed dose and go back to your regular dosing schedule. Never take extra medicine to make up for a missed dose. · Store the medicine in a closed container at room temperature, away from heat, moisture, and direct light. Drugs and Foods to Avoid:   Ask your doctor or pharmacist before using any other medicine, including over-the-counter medicines, vitamins, and herbal products.   · Do not use this medicine and an MAO inhibitor (MAOI) within 14 days of each other. Do not take this medicine if you are using or have used heroin or other narcotic drugs (including buprenorphine, codeine, methadone, or other habit-forming painkillers) within the past 7 to 10 days. Do not use naltrexone/bupropion if you are also using Zyban® to quit smoking or Aplenzin® or Wellbutrin® for depression, because they also contain bupropion. · Tell your doctor if you take barbiturates, benzodiazepines, antiseizure medicine, or sedatives, or if you have recently stopped taking them. · Some medicines and foods can affect how naltrexone/bupropion works. Tell your doctor if you use any of the following:  ¨ Amantadine, amiloride, cimetidine, clopidogrel, dopamine, famotidine, levodopa, memantine, metformin, metoprolol, oxaliplatin, pindolol, ranitidine, theophylline, ticlopidine, varenicline  ¨ Insulin or diabetes medicine  ¨ Medicine to treat depression  ¨ Medicine to treat mental illness (including haloperidol, risperidone, thioridazine)  ¨ Medicine to treat heart rhythm problems (including flecainide, procainamide, propafenone)  ¨ Medicine to treat HIV or AIDS (including efavirenz, lopinavir, ritonavir)  ¨ Medicine for pain, diarrhea, cough, or colds  ¨ Steroid medicine  · Do not drink alcohol while you are using this medicine. Warnings While Using This Medicine:   · It is not safe to take this medicine during pregnancy. It could harm an unborn baby. Tell your doctor right away if you become pregnant. · Do not breastfeed while you are using this medicine, unless your doctor says it is okay. · Tell your doctor if you have kidney disease, liver disease, diabetes, glaucoma, heart disease, mental problems (including bipolar disorder), or high blood pressure. Tell your doctor if you have a history of drug addiction or if you drink alcohol. · For some children, teenagers, and young adults, this medicine may increase mental or emotional problems.  This may lead to thoughts of suicide and violence. Talk with your doctor right away if you have any thoughts or behavior changes that concern you. Tell your doctor if you or anyone in your family has a history of bipolar disorder or suicide attempts. · This medicine may cause the following problems:  ¨ Increased risk of seizures  ¨ High blood pressure or heart rate  ¨ Serious allergic and skin reactions  ¨ Liver problems, including hepatitis  ¨ Changes in mood or behavior  ¨ Increased risk of hypoglycemia (low blood sugar) in patients with diabetes  · You have a higher risk of accidental overdose, serious injury, or death if you use heroin or any other narcotic medicine while you are being treated with this medicine. Also, naltrexone prevents you from feeling the effects of heroin if you use it. · Do not stop using this medicine suddenly. Your doctor will need to slowly decrease your dose before you stop it completely. · Tell any doctor or dentist who treats you that you are using this medicine. This medicine may affect certain medical test results. · Your doctor will check your progress and the effects of this medicine at regular visits. Keep all appointments. · Keep all medicine out of the reach of children. Never share your medicine with anyone.   Possible Side Effects While Using This Medicine:   Call your doctor right away if you notice any of these side effects:  · Allergic reaction: Itching or hives, swelling in your face or hands, swelling or tingling in your mouth or throat, chest tightness, trouble breathing  · Blistering, peeling, red skin rash  · Chest pain, trouble breathing, fast, slow, or pounding heartbeat  · Dark urine or pale stools, nausea, vomiting, loss of appetite, stomach pain, yellow skin or eyes  · Eye pain, vision changes, seeing halos around lights  · Muscle or joint pain, fever with rash  · Seeing or hearing things that are not there, feeling like people are against you  · Seizures  · Sudden increase in energy, racing thoughts, trouble sleeping  · Thoughts of hurting yourself, worsening depression, severe agitation or confusion  If you notice these less serious side effects, talk with your doctor:   · Dry mouth  · Headache, dizziness  · Nausea, constipation, diarrhea  If you notice other side effects that you think are caused by this medicine, tell your doctor. Call your doctor for medical advice about side effects. You may report side effects to FDA at 3-453-CXN-9290  © 2017 [facility] Information is for End User's use only and may not be sold, redistributed or otherwise used for commercial purposes. The above information is an  only. It is not intended as medical advice for individual conditions or treatments. Talk to your doctor, nurse or pharmacist before following any medical regimen to see if it is safe and effective for you. Phil  When This Medicine Should Not Be Used: This medicine is not right for everyone. Do not use it if you had an allergic reaction to dulaglutide, you have multiple endocrine neoplasia syndrome type 2 (MEN 2), or you or anyone in your family has had medullary thyroid cancer. How to Use This Medicine:   Injectable  · Your doctor will prescribe your exact dose. This medicine is usually given once a week, on the same day of the week. It is given as a shot under the skin of your stomach, thighs, or upper arms. · You may be taught how to give your medicine at home. Make sure you understand all instructions before giving yourself an injection. Do not use more medicine or use it more often than your doctor tells you to. · If you use insulin in addition to this medicine, do not mix them in the same syringe. You may give the shots in the same area (such as your stomach), but do not give the shots right next to each other. · If the medicine in the pen or prefilled syringe has changed color, looks cloudy, or has particles in it, do not use it.   · You will be shown the body areas where this shot can be given. Use a different body area each time you give yourself a shot. Keep track of where you give each shot to make sure you rotate body areas. · Use a new needle and syringe each time you inject your medicine. · This medicine should come with a Medication Guide. Ask your pharmacist for a copy if you do not have one. · Missed dose: Use a missed dose as soon as possible if there are at least 3 days before your next dose is due. If your next dose is due in less than 3 days, then skip the missed dose. · Store your medicine pens or prefilled syringes in the refrigerator and keep them in the original carton. Protect the pens from light. You may also store the pens at room temperature for up to 14 days. Do not freeze the medicine, and do not use the medicine if it has been frozen. Drugs and Foods to Avoid:      Ask your doctor or pharmacist before using any other medicine, including over-the-counter medicines, vitamins, and herbal products. Warnings While Using This Medicine:   · Tell your doctor if you are pregnant or breastfeeding, or if you have kidney disease or a history of pancreas problems. Tell your doctor if you have severe digestion problems (such as gastroparesis) or stomach or bowel problems. · This medicine may cause the following problems:  ¨ Increased risk of thyroid tumor  ¨ Pancreatitis  ¨ Low blood sugar (more likely if you also take insulin or other diabetes medicine)  · Your doctor will do lab tests at regular visits to check on the effects of this medicine. Keep all appointments. · Keep all medicine out of the reach of children. Never share your medicine with anyone. Do not share needles or pens because you can spread an infection.   Possible Side Effects While Using This Medicine:   Call your doctor right away if you notice any of these side effects:  · Allergic reaction: Itching or hives, swelling in your face or hands, swelling or tingling in your mouth or throat, chest tightness, trouble breathing  · A lump or swelling in your neck, trouble breathing or swallowing  · Change in how much or how often you urinate  · Shaking, trembling, sweating, fast or pounding heartbeat, lightheadedness, hunger, confusion  · Sudden and severe stomach pain, nausea, vomiting, fever, and lightheadedness  If you notice these less serious side effects, talk with your doctor:   · Diarrhea  · Redness, itching, swelling, or any changes in your skin where the shot was given  If you notice other side effects that you think are caused by this medicine, tell your doctor. Call your doctor for medical advice about side effects. You may report side effects to FDA at 1-678-FDA-8469  © 2017 [facility] Information is for End User's use only and may not be sold, redistributed or otherwise used for commercial purposes. The above information is an  only. It is not intended as medical advice for individual conditions or treatments. Talk to your doctor, nurse or pharmacist before following any medical regimen to see if it is safe and effective for you. Follow-up and Dispositions    · Return in about 4 months (around 1/8/2022).          Copy sent to:  Dr. Naomy Braden

## 2021-09-08 NOTE — PATIENT INSTRUCTIONS
Phentermine (By mouth)   Phentermine (FEN-ter-meen)  Helps you lose weight when used for a short time. Brand Name(s): Adipex-P, Lomaira   There may be other brand names for this medicine. When This Medicine Should Not Be Used: This medicine is not right for everyone. Do not use it if you had an allergic reaction to phentermine or similar medicines, or if you are pregnant. How to Use This Medicine:   Dissolving Tablet, Capsule, Long Acting Capsule, Tablet, Dissolving Tablet  · Your doctor will tell you how much medicine to use. Do not use more than directed. · This medicine is not for long-term use. · To avoid trouble sleeping, always take this medicine in the morning and never at bedtime or late in the evening. ¨ Take the capsule 2 hours after breakfast.  ¨ Take the extended-release capsule before breakfast.  ¨ Take the disintegrating tablet in the morning, with or without food. ¨ Take the phentermine tablet before breakfast or 1 to 2 hours after breakfast.  ¨ Take Lomaira tablet 30 minutes before meals. · Swallow the extended-release capsule whole. Do not crush, break, or chew it. · If you are using the disintegrating tablet, make sure your hands are dry before you handle the tablet. Place the tablet on your tongue. It should melt quickly. After the tablet has melted, swallow or take a drink of water. · Tablet: Swallow whole. Do not crush, break, or chew it. · Carefully follow your doctor's instructions about any special diet. · Missed dose: Take a dose as soon as you remember. If it is almost time for your next dose, wait until then and take a regular dose. Do not take extra medicine to make up for a missed dose. · Store the medicine in a closed container at room temperature, away from heat, moisture, and direct light. Drugs and Foods to Avoid:   Ask your doctor or pharmacist before using any other medicine, including over-the-counter medicines, vitamins, and herbal products.   · Do not use this medicine and an MAO inhibitor (MAOI) within 14 days of each other. · Some medicines can affect how phentermine works. Tell your doctor if you are using any of the following:  ¨ Amphetamine medicine (including dextroamphetamine, methamphetamine)  ¨ Diet pills  ¨ Insulin or diabetes medicine  ¨ Medicine to treat depression (including fluoxetine, fluvoxamine, paroxetine, sertraline)  · Do not drink alcohol while you are using this medicine. Warnings While Using This Medicine:   · It is not safe to take this medicine during pregnancy. It could harm an unborn baby. Tell your doctor right away if you become pregnant. · Tell your doctor if you are breastfeeding, or if you have kidney disease, diabetes, glaucoma, congestive heart failure, heart or blood vessel disease, high blood pressure, overactive thyroid, or a history of stroke or drug abuse. Tell your doctor if have allergies to aspirin or tartrazine. · This medicine may cause the following problems:  ¨ Primary pulmonary hypertension (a serious lung problem)  ¨ Heart valve disease  ¨ Changes in blood sugar levels  · This medicine may make you dizzy or drowsy. Do not drive or do anything else that could be dangerous until you know how this medicine affects you. · This medicine can be habit-forming. Do not use more than your prescribed dose. Call your doctor if you think your medicine is not working. · Do not stop using this medicine suddenly. Your doctor will need to slowly decrease your dose before you stop it completely. · Your doctor will check your progress and the effects of this medicine at regular visits. Keep all appointments. · Keep all medicine out of the reach of children. Never share your medicine with anyone.   Possible Side Effects While Using This Medicine:   Call your doctor right away if you notice any of these side effects:  · Allergic reaction: Itching or hives, swelling in your face or hands, swelling or tingling in your mouth or throat, chest tightness, trouble breathing  · Chest pain, fainting, trouble breathing  · Fast, slow, pounding, or uneven heartbeat  · Seizures or tremors  · Severe headache  · Swelling of your feet or lower legs  If you notice these less serious side effects, talk with your doctor:   · Changes in sex drive  · Dizziness, drowsiness, mild headache  · Dry mouth or a bad taste in your mouth  · Nausea, vomiting, diarrhea, constipation, stomach cramps  · Restlessness or nervousness, trouble sleeping  If you notice other side effects that you think are caused by this medicine, tell your doctor. Call your doctor for medical advice about side effects. You may report side effects to FDA at 7-588-MFK-9560  © 2017 [facility] Information is for End User's use only and may not be sold, redistributed or otherwise used for commercial purposes. The above information is an  only. It is not intended as medical advice for individual conditions or treatments. Talk to your doctor, nurse or pharmacist before following any medical regimen to see if it is safe and effective for you. Topiramate (By mouth)   Topiramate (toe-PIR-a-mate)  Treats and prevents seizures, and helps prevent migraine headaches. Brand Name(s): Qudexy XR, Topamax, Trokendi XR   There may be other brand names for this medicine. When This Medicine Should Not Be Used: This medicine is not right for everyone. Do not use it if you had an allergic reaction to topiramate, or if you are pregnant. How to Use This Medicine:   Capsule, Long Acting Capsule, Tablet  · Take your medicine as directed. Your dose may need to be changed several times to find what works best for you. · Tablet: Swallow whole. Do not break, crush, or chew the tablet. It has a very bitter taste. · Capsule or extended-release capsule: Do not crush or chew the capsule.  Swallow whole or open the capsule and pour the medicine into a small amount (1 teaspoon) of soft food, such as applesauce. Swallow the mixture right away without chewing. Do not store the mixture for use at a later time. · Drink extra fluids so you will urinate more often and help prevent kidney problems. · This medicine should come with a Medication Guide. Ask your pharmacist for a copy if you do not have one. · Missed dose: Take a dose as soon as you remember. If it is almost time for your next dose, wait until then and take a regular dose. Do not take extra medicine to make up for a missed dose. If you miss a dose or forget to use your medicine, use it as soon as you can. If your next regular dose of Topamax® is less than 6 hours away, wait until then to use the medicine and skip the missed dose. If you miss more than 1 dose of Topamax®, call your doctor for instructions. · Store the medicine in a closed container at room temperature, away from heat, moisture, and direct light. Drugs and Foods to Avoid:   Ask your doctor or pharmacist before using any other medicine, including over-the-counter medicines, vitamins, and herbal products. · Do not drink alcohol with Qudexy XR or Topamax®. Do not drink alcohol for 6 hours before and 6 hours after you take the Trokendi XR capsule. · Some medicines can affect how topiramate works. Tell your doctor if you are using acetazolamide, dichlorphenamide, dichloralphenazone, digoxin, lithium, metformin, zonisamide, other medicine for seizures (such as carbamazepine, phenytoin, valproic acid), or birth control pills. · Tell your doctor if you are using any medicine that makes you sleepy, such as allergy medicine or narcotic pain medicine. Warnings While Using This Medicine:   · It is not safe to take this medicine during pregnancy. It could harm an unborn baby. Tell your doctor right away if you become pregnant.   · Tell your doctor if you are breastfeeding, or if you have kidney disease, liver disease, glaucoma, lung or breathing problems, osteoporosis, or a history of depression or mood disorders. Tell your doctor if you are on a ketogenic diet (high in fat and low in carbohydrates). · This medicine may cause the following problems:  ¨ Eye pain or vision changes, including glaucoma  ¨ Changes in body temperature  ¨ Metabolic acidosis (too much acid in the blood)  ¨ Kidney stones  · This medicine may increase depression or thoughts of suicide. Tell your doctor right away if you start to feel more depressed or think about hurting yourself. · This medicine may make you dizzy, drowsy, or tired. Do not drive or do anything else that could be dangerous until you know how this medicine affects you. · Do not stop using this medicine suddenly. Your doctor will need to slowly decrease your dose before you stop it completely. · Your doctor will do lab tests at regular visits to check on the effects of this medicine. Keep all appointments. · Keep all medicine out of the reach of children. Never share your medicine with anyone.   Possible Side Effects While Using This Medicine:   Call your doctor right away if you notice any of these side effects:  · Allergic reaction: Itching or hives, swelling in your face or hands, swelling or tingling in your mouth or throat, chest tightness, trouble breathing  · Bloody or cloudy urine, painful urination, sudden lower back or stomach pain  · Changes in vision, eye pain  · Confusion, problems with walking, clumsiness, dizziness, or trouble talking, concentrating, or remembering  · Feeling agitated, depressed, nervous, or irritable, thoughts of hurting yourself or others, unusual mood or behavior  · Fever, decreased sweating  · Numbness, tingling, or burning pain in your hands, arms, legs, or feet  · Rapid, deep breathing, loss of appetite, fast or uneven heartbeat  · Vomiting, unusual drowsiness, tiredness, or weakness  If you notice these less serious side effects, talk with your doctor:   · Change in taste  · Nausea, diarrhea  · Stuffy or runny nose  · Weight loss  If you notice other side effects that you think are caused by this medicine, tell your doctor. Call your doctor for medical advice about side effects. You may report side effects to FDA at 1-391-HAY-2396  © 2017 [facility] Information is for End User's use only and may not be sold, redistributed or otherwise used for commercial purposes. The above information is an  only. It is not intended as medical advice for individual conditions or treatments. Talk to your doctor, nurse or pharmacist before following any medical regimen to see if it is safe and effective for you. Naltrexone/Bupropion (By mouth)   Bupropion Hydrochloride (agh-QZIS-wru-on ashlyn-droe-KLOR-riccardo), Naltrexone Hydrochloride (nal-TREX-one ashlyn-droe-KLOR-riccardo)  Used with diet and exercise to help you lose weight. Brand Name(s): Contrave   There may be other brand names for this medicine. When This Medicine Should Not Be Used: This medicine is not right for everyone. Do not use it if you had an allergic reaction to naltrexone or bupropion, you are pregnant, or you have seizures, anorexia, or bulimia. How to Use This Medicine:   Long Acting Tablet  · Take your medicine as directed. Your dose may need to be changed several times to find what works best for you. · Swallow the extended-release tablet whole. Do not crush, break, or chew it. · It is best to take this medicine with food or milk. However, do not take this medicine with high-fat meals. This may increase your risk of seizures. · This medicine should come with a Medication Guide. Ask your pharmacist for a copy if you do not have one. · Missed dose: Skip the missed dose and go back to your regular dosing schedule. Never take extra medicine to make up for a missed dose. · Store the medicine in a closed container at room temperature, away from heat, moisture, and direct light.   Drugs and Foods to Avoid:   Ask your doctor or pharmacist before using any other medicine, including over-the-counter medicines, vitamins, and herbal products. · Do not use this medicine and an MAO inhibitor (MAOI) within 14 days of each other. Do not take this medicine if you are using or have used heroin or other narcotic drugs (including buprenorphine, codeine, methadone, or other habit-forming painkillers) within the past 7 to 10 days. Do not use naltrexone/bupropion if you are also using Zyban® to quit smoking or Aplenzin® or Wellbutrin® for depression, because they also contain bupropion. · Tell your doctor if you take barbiturates, benzodiazepines, antiseizure medicine, or sedatives, or if you have recently stopped taking them. · Some medicines and foods can affect how naltrexone/bupropion works. Tell your doctor if you use any of the following:  ¨ Amantadine, amiloride, cimetidine, clopidogrel, dopamine, famotidine, levodopa, memantine, metformin, metoprolol, oxaliplatin, pindolol, ranitidine, theophylline, ticlopidine, varenicline  ¨ Insulin or diabetes medicine  ¨ Medicine to treat depression  ¨ Medicine to treat mental illness (including haloperidol, risperidone, thioridazine)  ¨ Medicine to treat heart rhythm problems (including flecainide, procainamide, propafenone)  ¨ Medicine to treat HIV or AIDS (including efavirenz, lopinavir, ritonavir)  ¨ Medicine for pain, diarrhea, cough, or colds  ¨ Steroid medicine  · Do not drink alcohol while you are using this medicine. Warnings While Using This Medicine:   · It is not safe to take this medicine during pregnancy. It could harm an unborn baby. Tell your doctor right away if you become pregnant. · Do not breastfeed while you are using this medicine, unless your doctor says it is okay. · Tell your doctor if you have kidney disease, liver disease, diabetes, glaucoma, heart disease, mental problems (including bipolar disorder), or high blood pressure.  Tell your doctor if you have a history of drug addiction or if you drink alcohol. · For some children, teenagers, and young adults, this medicine may increase mental or emotional problems. This may lead to thoughts of suicide and violence. Talk with your doctor right away if you have any thoughts or behavior changes that concern you. Tell your doctor if you or anyone in your family has a history of bipolar disorder or suicide attempts. · This medicine may cause the following problems:  ¨ Increased risk of seizures  ¨ High blood pressure or heart rate  ¨ Serious allergic and skin reactions  ¨ Liver problems, including hepatitis  ¨ Changes in mood or behavior  ¨ Increased risk of hypoglycemia (low blood sugar) in patients with diabetes  · You have a higher risk of accidental overdose, serious injury, or death if you use heroin or any other narcotic medicine while you are being treated with this medicine. Also, naltrexone prevents you from feeling the effects of heroin if you use it. · Do not stop using this medicine suddenly. Your doctor will need to slowly decrease your dose before you stop it completely. · Tell any doctor or dentist who treats you that you are using this medicine. This medicine may affect certain medical test results. · Your doctor will check your progress and the effects of this medicine at regular visits. Keep all appointments. · Keep all medicine out of the reach of children. Never share your medicine with anyone.   Possible Side Effects While Using This Medicine:   Call your doctor right away if you notice any of these side effects:  · Allergic reaction: Itching or hives, swelling in your face or hands, swelling or tingling in your mouth or throat, chest tightness, trouble breathing  · Blistering, peeling, red skin rash  · Chest pain, trouble breathing, fast, slow, or pounding heartbeat  · Dark urine or pale stools, nausea, vomiting, loss of appetite, stomach pain, yellow skin or eyes  · Eye pain, vision changes, seeing halos around lights  · Muscle or joint pain, fever with rash  · Seeing or hearing things that are not there, feeling like people are against you  · Seizures  · Sudden increase in energy, racing thoughts, trouble sleeping  · Thoughts of hurting yourself, worsening depression, severe agitation or confusion  If you notice these less serious side effects, talk with your doctor:   · Dry mouth  · Headache, dizziness  · Nausea, constipation, diarrhea  If you notice other side effects that you think are caused by this medicine, tell your doctor. Call your doctor for medical advice about side effects. You may report side effects to FDA at 2-557-TPO-5795  © 2017 [facility] Information is for End User's use only and may not be sold, redistributed or otherwise used for commercial purposes. The above information is an  only. It is not intended as medical advice for individual conditions or treatments. Talk to your doctor, nurse or pharmacist before following any medical regimen to see if it is safe and effective for you. [email or web address]  When This Medicine Should Not Be Used: This medicine is not right for everyone. Do not use it if you had an allergic reaction to dulaglutide, you have multiple endocrine neoplasia syndrome type 2 (MEN 2), or you or anyone in your family has had medullary thyroid cancer. How to Use This Medicine:   Injectable  · Your doctor will prescribe your exact dose. This medicine is usually given once a week, on the same day of the week. It is given as a shot under the skin of your stomach, thighs, or upper arms. · You may be taught how to give your medicine at home. Make sure you understand all instructions before giving yourself an injection. Do not use more medicine or use it more often than your doctor tells you to. · If you use insulin in addition to this medicine, do not mix them in the same syringe.  You may give the shots in the same area (such as your stomach), but do not give the shots right next to each other. · If the medicine in the pen or prefilled syringe has changed color, looks cloudy, or has particles in it, do not use it. · You will be shown the body areas where this shot can be given. Use a different body area each time you give yourself a shot. Keep track of where you give each shot to make sure you rotate body areas. · Use a new needle and syringe each time you inject your medicine. · This medicine should come with a Medication Guide. Ask your pharmacist for a copy if you do not have one. · Missed dose: Use a missed dose as soon as possible if there are at least 3 days before your next dose is due. If your next dose is due in less than 3 days, then skip the missed dose. · Store your medicine pens or prefilled syringes in the refrigerator and keep them in the original carton. Protect the pens from light. You may also store the pens at room temperature for up to 14 days. Do not freeze the medicine, and do not use the medicine if it has been frozen. Drugs and Foods to Avoid:      Ask your doctor or pharmacist before using any other medicine, including over-the-counter medicines, vitamins, and herbal products. Warnings While Using This Medicine:   · Tell your doctor if you are pregnant or breastfeeding, or if you have kidney disease or a history of pancreas problems. Tell your doctor if you have severe digestion problems (such as gastroparesis) or stomach or bowel problems. · This medicine may cause the following problems:  ¨ Increased risk of thyroid tumor  ¨ Pancreatitis  ¨ Low blood sugar (more likely if you also take insulin or other diabetes medicine)  · Your doctor will do lab tests at regular visits to check on the effects of this medicine. Keep all appointments. · Keep all medicine out of the reach of children. Never share your medicine with anyone. Do not share needles or pens because you can spread an infection.   Possible Side Effects While Using This Medicine:   Call your doctor right away if you notice any of these side effects:  · Allergic reaction: Itching or hives, swelling in your face or hands, swelling or tingling in your mouth or throat, chest tightness, trouble breathing  · A lump or swelling in your neck, trouble breathing or swallowing  · Change in how much or how often you urinate  · Shaking, trembling, sweating, fast or pounding heartbeat, lightheadedness, hunger, confusion  · Sudden and severe stomach pain, nausea, vomiting, fever, and lightheadedness  If you notice these less serious side effects, talk with your doctor:   · Diarrhea  · Redness, itching, swelling, or any changes in your skin where the shot was given  If you notice other side effects that you think are caused by this medicine, tell your doctor. Call your doctor for medical advice about side effects. You may report side effects to FDA at 5-129-FDA-9736  © 2017 [facility] Information is for End User's use only and may not be sold, redistributed or otherwise used for commercial purposes. The above information is an  only. It is not intended as medical advice for individual conditions or treatments. Talk to your doctor, nurse or pharmacist before following any medical regimen to see if it is safe and effective for you.

## 2021-09-13 LAB
17OHP SERPL-MCNC: 31 NG/DL
ALBUMIN SERPL-MCNC: 4.4 G/DL (ref 3.8–4.8)
ALBUMIN/GLOB SERPL: 1.5 {RATIO} (ref 1.2–2.2)
ALP SERPL-CCNC: 81 IU/L (ref 48–121)
ALT SERPL-CCNC: 9 IU/L (ref 0–32)
AST SERPL-CCNC: 16 IU/L (ref 0–40)
BILIRUB SERPL-MCNC: 0.3 MG/DL (ref 0–1.2)
BUN SERPL-MCNC: 12 MG/DL (ref 6–20)
BUN/CREAT SERPL: 15 (ref 9–23)
CALCIUM SERPL-MCNC: 9.3 MG/DL (ref 8.7–10.2)
CHLORIDE SERPL-SCNC: 100 MMOL/L (ref 96–106)
CO2 SERPL-SCNC: 24 MMOL/L (ref 20–29)
CREAT SERPL-MCNC: 0.79 MG/DL (ref 0.57–1)
DHEA-S SERPL-MCNC: 206 UG/DL (ref 84.8–378)
EST. AVERAGE GLUCOSE BLD GHB EST-MCNC: 114 MG/DL
FSH SERPL-ACNC: 7.1 MIU/ML
GLOBULIN SER CALC-MCNC: 3 G/DL (ref 1.5–4.5)
GLUCOSE SERPL-MCNC: 83 MG/DL (ref 65–99)
HBA1C MFR BLD: 5.6 % (ref 4.8–5.6)
LH SERPL-ACNC: 6.9 MIU/ML
POTASSIUM SERPL-SCNC: 4.6 MMOL/L (ref 3.5–5.2)
PROT SERPL-MCNC: 7.4 G/DL (ref 6–8.5)
SODIUM SERPL-SCNC: 136 MMOL/L (ref 134–144)
TESTOST SERPL-MCNC: 16.1 NG/DL (ref 10–55)

## 2021-09-14 ENCOUNTER — TELEPHONE (OUTPATIENT)
Dept: ENDOCRINOLOGY | Age: 31
End: 2021-09-14

## 2021-09-15 ENCOUNTER — TELEPHONE (OUTPATIENT)
Dept: ENDOCRINOLOGY | Age: 31
End: 2021-09-15

## 2021-09-15 DIAGNOSIS — E66.01 CLASS 3 SEVERE OBESITY WITH SERIOUS COMORBIDITY IN ADULT, UNSPECIFIED BMI, UNSPECIFIED OBESITY TYPE (HCC): Primary | ICD-10-CM

## 2021-09-15 NOTE — TELEPHONE ENCOUNTER
[facility] She states that her insurance will not cover Wegovy or the Qysemia. She states they will cover Topamax. Will pay out of pocket for Phentermine.

## 2021-09-15 NOTE — TELEPHONE ENCOUNTER
Spoke with pt regarding her labs. Results for orders placed or performed in visit on 09/08/21   HEMOGLOBIN A1C WITH EAG   Result Value Ref Range    Hemoglobin A1c 5.6 4.8 - 5.6 %    Estimated average glucose 269 mg/dL   METABOLIC PANEL, COMPREHENSIVE   Result Value Ref Range    Glucose 83 65 - 99 mg/dL    BUN 12 6 - 20 mg/dL    Creatinine 0.79 0.57 - 1.00 mg/dL    GFR est non-AA 99 >59 mL/min/1.73    GFR est  >59 mL/min/1.73    BUN/Creatinine ratio 15 9 - 23    Sodium 136 134 - 144 mmol/L    Potassium 4.6 3.5 - 5.2 mmol/L    Chloride 100 96 - 106 mmol/L    CO2 24 20 - 29 mmol/L    Calcium 9.3 8.7 - 10.2 mg/dL    Protein, total 7.4 6.0 - 8.5 g/dL    Albumin 4.4 3.8 - 4.8 g/dL    GLOBULIN, TOTAL 3.0 1.5 - 4.5 g/dL    A-G Ratio 1.5 1.2 - 2.2    Bilirubin, total 0.3 0.0 - 1.2 mg/dL    Alk. phosphatase 81 48 - 121 IU/L    AST (SGOT) 16 0 - 40 IU/L    ALT (SGPT) 9 0 - 32 IU/L   FSH AND LH   Result Value Ref Range    Luteinizing hormone 6.9 mIU/mL    FSH 7.1 mIU/mL   TESTOSTERONE, TOTAL, FEMALE/CHILD   Result Value Ref Range    Testosterone, Serum (Total) 16.1 10.0 - 55.0 ng/dL   DHEA SULFATE   Result Value Ref Range    DHEA Sulfate 206.0 84.8 - 378.0 ug/dL   17-OH PROGESTERONE LCMS   Result Value Ref Range    17-OH Progesterone 31 ng/dL     Pt to continue the Metformin 1000mg daily and her Spironolactone at it's current dose. Pt to call her insurance and ask about the weight loss rider and we again discussed phentermine/topiramate and the weekly Wegovy. Pt to call back after she has spoken with her insurance company.

## 2021-09-16 ENCOUNTER — TELEPHONE (OUTPATIENT)
Dept: ENDOCRINOLOGY | Age: 31
End: 2021-09-16

## 2021-09-16 RX ORDER — TOPIRAMATE 50 MG/1
50 TABLET, FILM COATED ORAL 2 TIMES DAILY
Qty: 180 TABLET | Refills: 1 | Status: SHIPPED | OUTPATIENT
Start: 2021-09-16 | End: 2021-10-11 | Stop reason: SDUPTHER

## 2021-09-16 RX ORDER — PHENTERMINE HYDROCHLORIDE 37.5 MG/1
18.75 TABLET ORAL
Qty: 100 TABLET | Refills: 1 | Status: SHIPPED | OUTPATIENT
Start: 2021-09-16 | End: 2021-10-11 | Stop reason: SDUPTHER

## 2021-09-16 NOTE — TELEPHONE ENCOUNTER
Called pt to discuss the Phentermine and Topirmate and how to start these medications. Left voice mail, waiting for call back      Phentermine - 18.75 mg (1/2 of 37.5 mg tablet) daily. Take early in the morning. Let me know if you have any problems with increase heart rate, blood pressure, anxiety or trouble sleeping as these can be side effects. If symptoms are mild, you body should acclimate to this and any related symptoms should improve. Topiramate - 50 mg tablet. Increase dose gradually as recommended below   - Step 1: Start with 1/2 tablet (25 mg) at bedtime x 2 weeks   - Step 2: Increase to 25 mg twice daily   - Step 3: Increase to 25 mg in AM and 50 mg at night. - Step 4: Increase to 50 mg twice daily   Stop or delay titration at any time if you feel you are doing great at current dose or if you feel you are noting some unwanted side effects   Side effect may be sleepiness. Also, if you notice any changes in your thinking, then decrease medication or stop. Let me know if you have questions     If she were to become pregnant she would need to stop these medications and they could be harmful to a fetus.

## 2021-09-16 NOTE — TELEPHONE ENCOUNTER
Patient is driving. The instructions were read to the patient. Recommended to call back when she has a pencil/paper to write down the titration directions. Patient agreed and will call back.

## 2021-09-20 NOTE — TELEPHONE ENCOUNTER
Advised her VM that the instructions can be sent via her secure email or secure fax. Advised to leave a message if she wants the instructions faxed or emailed.

## 2021-10-11 DIAGNOSIS — E66.01 CLASS 3 SEVERE OBESITY WITH SERIOUS COMORBIDITY IN ADULT, UNSPECIFIED BMI, UNSPECIFIED OBESITY TYPE (HCC): ICD-10-CM

## 2021-10-11 RX ORDER — PHENTERMINE HYDROCHLORIDE 37.5 MG/1
18.75 TABLET ORAL
Qty: 100 TABLET | Refills: 1 | Status: SHIPPED | OUTPATIENT
Start: 2021-10-11

## 2021-10-11 RX ORDER — TOPIRAMATE 50 MG/1
50 TABLET, FILM COATED ORAL 2 TIMES DAILY
Qty: 180 TABLET | Refills: 1 | Status: SHIPPED | OUTPATIENT
Start: 2021-10-11

## 2021-10-11 NOTE — TELEPHONE ENCOUNTER
Patient called stating it is cheaper to get Phentermine and topamax through [address] for 90 day supply.

## 2021-10-22 ENCOUNTER — TELEPHONE (OUTPATIENT)
Dept: ENDOCRINOLOGY | Age: 31
End: 2021-10-22

## 2021-10-22 NOTE — TELEPHONE ENCOUNTER
I spoke with pt regarding her symptoms. She is currently taking Topiramate 25mg BID, she just started the BID dose this week. I instructed her to stop taking the Topiramate and only take the Phentermine 1/2 tab every morning. Pt voices understanding and agreement with the plan.

## 2021-10-22 NOTE — TELEPHONE ENCOUNTER
Patient called stating that she is concerned that she is having side effects from Phentermine/Topamax. She is having issues of confusion and \"loss of words\" with decreased concentration. She read that those symptoms are side effects of medication. She is still taking Phentermine 1/2 tab daily and Topamax daily. She is concerned that the side effects may not be reversible.

## 2021-11-08 ENCOUNTER — TELEPHONE (OUTPATIENT)
Dept: ENDOCRINOLOGY | Age: 31
End: 2021-11-08

## 2022-02-28 ENCOUNTER — OFFICE VISIT (OUTPATIENT)
Dept: ORTHOPEDIC SURGERY | Age: 32
End: 2022-02-28
Payer: COMMERCIAL

## 2022-02-28 DIAGNOSIS — M25.562 ACUTE PAIN OF LEFT KNEE: Primary | ICD-10-CM

## 2022-02-28 DIAGNOSIS — S83.272A COMPLEX TEAR OF LATERAL MENISCUS OF LEFT KNEE, UNSPECIFIED WHETHER OLD OR CURRENT TEAR, INITIAL ENCOUNTER: ICD-10-CM

## 2022-02-28 PROCEDURE — 99214 OFFICE O/P EST MOD 30 MIN: CPT | Performed by: ORTHOPAEDIC SURGERY

## 2022-02-28 NOTE — PROGRESS NOTES
Carito Alba (: 1990) is a 33 y.o. female, patient, here for evaluation of the following chief complaint(s):  Knee Pain (left knee pain)       HPI:    Patient presents the office with a chief complaint of left knee pain. Patient states she has had on and off left knee pain now for over 4-1/2 to 5 months. She works as a nurse in the [employer]. She has difficulty with her daily activities as well as pushing turning and twisting. She saw one of our physician assistants. Apparently a cortisone injection was performed. She did not have much improvement. She has recurrent pain as she describes a 2 the lateral aspect of the knee. She notices catching popping and locking. No Known Allergies    Current Outpatient Medications   Medication Sig    phentermine (ADIPEX-P) 37.5 mg tablet Take 0.5 Tablets by mouth every morning. Max Daily Amount: 18.75 mg.  topiramate (TOPAMAX) 50 mg tablet Take 1 Tablet by mouth two (2) times a day.  amLODIPine (NORVASC) 10 mg tablet Take 10 mg by mouth daily.  SPIRONOLACTONE PO Take  by mouth daily. Unsure of strength    OTHER BCP    metFORMIN (GLUCOPHAGE) 1,000 mg tablet Take 1,000 mg by mouth daily. No current facility-administered medications for this visit.        Past Medical History:   Diagnosis Date    Hypertension     genetic    [facility] 40.0-44.9[facility]    PCOS (polycystic ovarian syndrome) 2019        Past Surgical History:   Procedure Laterality Date    HX HERNIA REPAIR      as an infant       Family History   Problem Relation Age of Onset    Hypertension Mother     Thyroid Disease Mother     Liver Disease Mother         hepatitis    Cancer Father         breast and prostate    Hypertension Father     Crohn's Disease Father     No Known Problems Sister     No Known Problems Brother         ADOPTED    Liver Disease Maternal Grandmother         Hepatitis    Cancer Maternal Grandmother         Breast    Heart defect Maternal Grandfather         aortic aneursym    Hypertension Maternal Grandfather     Other Paternal Grandmother         brain aneurysm    Hypertension Paternal Grandmother     No Known Problems Paternal Grandfather     Cancer Maternal Aunt         Breast    Diabetes Maternal Aunt         Social History     Socioeconomic History    Marital status: SINGLE     Spouse name: Not on file    Number of children: Not on file    Years of education: Not on file    Highest education level: Not on file   Occupational History    Not on file   Tobacco Use    Smoking status: Never Smoker    Smokeless tobacco: Never Used   Substance and Sexual Activity    Alcohol use: Yes     Comment: weekly    Drug use: No    Sexual activity: Not on file   Other Topics Concern    Not on file   Social History Narrative    ** Merged History Encounter **          Social Determinants of Health     Financial Resource Strain:     Difficulty of Paying Living Expenses: Not on file   Food Insecurity:     Worried About [address] in the Last Year: Not on file    [address] [employer] in the Last Year: Not on file   Transportation Needs:     Lack of Transportation (Medical): Not on file    Lack of Transportation (Non-Medical):  Not on file   Physical Activity:     Days of Exercise per Week: Not on file    Minutes of Exercise per Session: Not on file   Stress:     Feeling of Stress : Not on file   Social Connections:     Frequency of Communication with Friends and Family: Not on file    Frequency of Social Gatherings with Friends and Family: Not on file    Attends Moravian Services: Not on file    Active Member of Clubs or Organizations: Not on file    Attends Club or Organization Meetings: Not on file    Marital Status: Not on file   Intimate Partner Violence:     Fear of Current or Ex-Partner: Not on file    Emotionally Abused: Not on file    Physically Abused: Not on file    Sexually Abused: Not on file Housing Stability:     Unable to Pay for Housing in the Last Year: Not on file    Number of Places Lived in the Last Year: Not on file    Unstable Housing in the Last Year: Not on file       Review of Systems   Musculoskeletal:        Left knee pain       Vitals: There were no vitals taken for this visit. There is no height or weight on file to calculate BMI. Ortho Exam     Patient is alert and oriented x3. She ambulates with a nonantalgic gait. Left knee:  Mild effusion. Full range of motion the knee joint. There is no pain with manipulation of the patella. There is no crepitation with passive or active range of motion. There is no medial joint tenderness. Positive lateral joint line tenderness. Dorina's maneuver is positive for lateral joint line tenderness. Collateral ligaments are intact. Lachman's test negative. Anterior drawer and posterior drawer negative. There is no soft tissue swelling distally. Neurovascular examination is intact. Right knee: There is no abrasions, lacerations, ecchymosis or soft tissue swelling. No effusion is identified. There is no pain to palpation along the medial or lateral border of the patella. There is no pain or crepitation with manipulation of the patella. There is normal excursion of the patella. Patellar grind test is negative. Active and passive range of motion is full and does not cause pain or crepitation. There is no pain with palpation along the medial femoral epicondyle or medial tibia and no pain with palpation over the lateral femoral epicondyle. There is no medial or lateral joint line tenderness. Dorina's maneuver is negative. There is no collateral ligament instability. Anterior drawer, Lachman and posterior drawer are negative. There is no soft tissue swelling distally into the leg. Neurocirculatory examination is intact.         XR Results (most recent):  Results from Appointment encounter on 02/28/22    XR KNEE LT MIN 4 V    Narrative  4 view x-ray of the left knee reveals no fracture dislocation. I do not appreciate any significant signs of osteoarthritis. ASSESSMENT/PLAN:    Despite activity modifications and cortisone injection, patient continues to have left lateral knee pain. 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 I would recommend physical therapy moving forward. Patient agrees.   Patient is to return to the office after the Shahida Kingsley MD

## 2022-03-07 ENCOUNTER — HOSPITAL ENCOUNTER (OUTPATIENT)
Dept: MRI IMAGING | Age: 32
Discharge: HOME | End: 2022-03-07
Attending: ORTHOPAEDIC SURGERY
Payer: COMMERCIAL

## 2022-03-07 DIAGNOSIS — M25.562 ACUTE PAIN OF LEFT KNEE: ICD-10-CM

## 2022-03-07 PROCEDURE — 73721 MRI JNT OF LWR EXTRE W/O DYE: CPT

## 2022-03-14 ENCOUNTER — OFFICE VISIT (OUTPATIENT)
Dept: ORTHOPEDIC SURGERY | Age: 32
End: 2022-03-14
Payer: COMMERCIAL

## 2022-03-14 DIAGNOSIS — M23.42 LOOSE BODY IN KNEE, LEFT KNEE: Primary | ICD-10-CM

## 2022-03-14 PROCEDURE — 99214 OFFICE O/P EST MOD 30 MIN: CPT | Performed by: ORTHOPAEDIC SURGERY

## 2022-03-14 NOTE — PROGRESS NOTES
Margarita Alba (: 1990) is a 33 y.o. female, patient, here for evaluation of the following chief complaint(s):  Knee Pain (left knee MRI results)       HPI:    Patient presents the office now status post MRI evaluation of left knee. She continues to have similar symptoms as prior documented. Not on File    Current Outpatient Medications   Medication Sig    phentermine (ADIPEX-P) 37.5 mg tablet Take 0.5 Tablets by mouth every morning. Max Daily Amount: 18.75 mg.  topiramate (TOPAMAX) 50 mg tablet Take 1 Tablet by mouth two (2) times a day.  amLODIPine (NORVASC) 10 mg tablet Take 10 mg by mouth daily.  SPIRONOLACTONE PO Take  by mouth daily. Unsure of strength    OTHER BCP    metFORMIN (GLUCOPHAGE) 1,000 mg tablet Take 1,000 mg by mouth daily. No current facility-administered medications for this visit.        Past Medical History:   Diagnosis Date    Hypertension     genetic    Morbid obesity with BMI of 40.0-44.9, adult ([facility] 2019    PCOS (polycystic ovarian syndrome) 2019        Past Surgical History:   Procedure Laterality Date    HX HERNIA REPAIR      as an infant       Family History   Problem Relation Age of Onset    Hypertension Mother     Thyroid Disease Mother     Liver Disease Mother         hepatitis    Cancer Father         breast and prostate    Hypertension Father     Crohn's Disease Father     No Known Problems Sister     No Known Problems Brother         ADOPTED    Liver Disease Maternal Grandmother         Hepatitis    Cancer Maternal Grandmother         Breast    Heart defect Maternal Grandfather         aortic aneursym    Hypertension Maternal Grandfather     Other Paternal Grandmother         brain aneurysm    Hypertension Paternal Grandmother     No Known Problems Paternal Grandfather     Cancer Maternal Aunt         Breast    Diabetes Maternal Aunt         Social History     Socioeconomic History    Marital status: SINGLE     Spouse name: Not on file    Number of children: Not on file    Years of education: Not on file    Highest education level: Not on file   Occupational History    Not on file   Tobacco Use    Smoking status: Never Smoker    Smokeless tobacco: Never Used   Substance and Sexual Activity    Alcohol use: Yes     Comment: weekly    Drug use: No    Sexual activity: Not on file   Other Topics Concern    Not on file   Social History Narrative    ** Merged History Encounter **          Social Determinants of Health     Financial Resource Strain:     Difficulty of Paying Living Expenses: Not on file   Food Insecurity:     Worried About Running Out of Food in the Last Year: Not on file    Kye of Food in the Last Year: Not on file   Transportation Needs:     Lack of Transportation (Medical): Not on file    Lack of Transportation (Non-Medical): Not on file   Physical Activity:     Days of Exercise per Week: Not on file    Minutes of Exercise per Session: Not on file   Stress:     Feeling of Stress : Not on file   Social Connections:     Frequency of Communication with Friends and Family: Not on file    Frequency of Social Gatherings with Friends and Family: Not on file    Attends Muslim Services: Not on file    Active Member of [address] [place] [address] [employer] or Organizations: Not on file    Attends Club or Organization Meetings: Not on file    Marital Status: Not on file   Intimate Partner Violence:     Fear of Current or Ex-Partner: Not on file    Emotionally Abused: Not on file    Physically Abused: Not on file    Sexually Abused: Not on file   Housing Stability:     Unable to Pay for Housing in the Last Year: Not on file    Number of [place] in the Last Year: Not on file    Unstable Housing in the Last Year: Not on file       Review of Systems   Musculoskeletal:        Left knee MRI results       Vitals: There were no vitals taken for this visit. There is no height or weight on file to calculate BMI.     Ortho Exam Left knee:  Mild effusion. Full range of motion the knee joint. There is no pain with manipulation of the patella. There is no crepitation with passive or active range of motion. There is no medial joint tenderness. Positive lateral joint line tenderness. Dorina's maneuver is positive for lateral joint line tenderness. Collateral ligaments are intact. Lachman's test negative. Anterior drawer and posterior drawer negative. There is no soft tissue swelling distally. Neurovascular examination is intact. MRI evaluation shows evidence of chondrosis noted along the lateral femur with loose bodies noted with a relatively large loose body noted in the suprapatellar pouch      ASSESSMENT/PLAN:    I have gone over the MRI with the patient. She continues to have recurrent pain localized to the lateral aspect of the knee. She also has an effusion. I think it is reasonable in her prep to consider arthroscopic debridement and removal loose body in this scenario. 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 I think it is reasonable. I talked about the use of anti-inflammatory medication as well as a sleeve and ice and elevation. We did talk about arthroscopy in this scenario. We discussed the risks and benefits of knee arthroscopy. The risks and benefits were described to the patient. The patient understands there is a risk of infection, postoperative pain, numbness, tingling, stiffness DVT, PE, MI, CVA and any other unforeseen events. The patient also understands there is a long rehabilitative process that typically follows the surgical procedure. We talked about the possibility of not being able to alleviate all of the discomfort. Also, I explained  there is no guarantee all function and strength will return. The patient understands the possiblity that  implants may be utilized during this surgery.   The patient also understands the generalized, associated risk of anesthetic and wishes to proceed in an elective fashion.         Sarah Stone MD

## 2022-03-18 PROBLEM — E28.2 PCOS (POLYCYSTIC OVARIAN SYNDROME): Status: ACTIVE | Noted: 2019-04-14

## 2022-03-18 PROBLEM — K21.9 GASTROESOPHAGEAL REFLUX DISEASE: Status: ACTIVE | Noted: 2019-04-14

## 2022-03-18 PROBLEM — E66.01 MORBID OBESITY WITH BMI OF 40.0-44.9, ADULT (HCC): Status: ACTIVE | Noted: 2019-04-14

## 2022-03-19 PROBLEM — R07.9 CHEST PAIN: Status: ACTIVE | Noted: 2019-04-14

## 2022-05-04 DIAGNOSIS — M23.42 LOOSE BODY IN KNEE, LEFT KNEE: Primary | ICD-10-CM

## 2022-06-23 ENCOUNTER — DOCUMENTATION ONLY (OUTPATIENT)
Dept: ORTHOPEDIC SURGERY | Age: 32
End: 2022-06-23

## 2022-06-27 DIAGNOSIS — M23.42 LOOSE BODY IN KNEE, LEFT KNEE: Primary | ICD-10-CM

## 2022-07-01 RX ORDER — OXYCODONE AND ACETAMINOPHEN 5; 325 MG/1; MG/1
1 TABLET ORAL
Qty: 40 TABLET | Refills: 0 | Status: SHIPPED | OUTPATIENT
Start: 2022-07-01 | End: 2022-07-08

## 2022-07-11 ENCOUNTER — OFFICE VISIT (OUTPATIENT)
Dept: ORTHOPEDIC SURGERY | Age: 32
End: 2022-07-11
Payer: COMMERCIAL

## 2022-07-11 DIAGNOSIS — M23.42 LOOSE BODY IN KNEE, LEFT KNEE: Primary | ICD-10-CM

## 2022-07-11 DIAGNOSIS — Z98.890 STATUS POST ARTHROSCOPY OF LEFT KNEE: ICD-10-CM

## 2022-07-11 PROCEDURE — 99024 POSTOP FOLLOW-UP VISIT: CPT | Performed by: ORTHOPAEDIC SURGERY

## 2022-07-11 NOTE — PROGRESS NOTES
Ivy Walker (: 1990) is a 33 y.o. female, patient, here for evaluation of the following chief complaint(s):  Knee Pain (post op left knee)       HPI:    Patient presents to the office today now status post arthroscopy of her knee left knee. Patient states she has been noticing pain. She does note a little bit of tightness. She denies calf pain or shortness of breath. Not on File    Current Outpatient Medications   Medication Sig    phentermine (ADIPEX-P) 37.5 mg tablet Take 0.5 Tablets by mouth every morning. Max Daily Amount: 18.75 mg.  topiramate (TOPAMAX) 50 mg tablet Take 1 Tablet by mouth two (2) times a day.  amLODIPine (NORVASC) 10 mg tablet Take 10 mg by mouth daily.  SPIRONOLACTONE PO Take  by mouth daily. Unsure of strength    OTHER BCP    metFORMIN (GLUCOPHAGE) 1,000 mg tablet Take 1,000 mg by mouth daily. No current facility-administered medications for this visit.        Past Medical History:   Diagnosis Date    Hypertension     genetic    Morbid obesity with BMI of 40.0-44.9, adult ([facility] 2019    PCOS (polycystic ovarian syndrome) 2019        Past Surgical History:   Procedure Laterality Date    HX HERNIA REPAIR      as an infant       Family History   Problem Relation Age of Onset    Hypertension Mother     Thyroid Disease Mother     Liver Disease Mother         hepatitis    Cancer Father         breast and prostate    Hypertension Father     Crohn's Disease Father     No Known Problems Sister     No Known Problems Brother         ADOPTED    Liver Disease Maternal Grandmother         Hepatitis    Cancer Maternal Grandmother         Breast    Heart defect Maternal Grandfather         aortic aneursym    Hypertension Maternal Grandfather     Other Paternal Grandmother         brain aneurysm    Hypertension Paternal Grandmother     No Known Problems Paternal Grandfather     Cancer Maternal Aunt         Breast    Diabetes Maternal Aunt Social History     Socioeconomic History    Marital status: SINGLE     Spouse name: Not on file    Number of children: Not on file    Years of education: Not on file    Highest education level: Not on file   Occupational History    Not on file   Tobacco Use    Smoking status: Never Smoker    Smokeless tobacco: Never Used   Substance and Sexual Activity    Alcohol use: Yes     Comment: weekly    Drug use: No    Sexual activity: Not on file   Other Topics Concern    Not on file   Social History Narrative    ** Merged History Encounter **          Social Determinants of Health     Financial Resource Strain:     Difficulty of Paying Living Expenses: Not on file   Food Insecurity:     Worried About Running Out of Food in the Last Year: Not on file    Kye of Food in the Last Year: Not on file   Transportation Needs:     Lack of Transportation (Medical): Not on file    Lack of Transportation (Non-Medical): Not on file   Physical Activity:     Days of Exercise per Week: Not on file    Minutes of Exercise per Session: Not on file   Stress:     Feeling of Stress : Not on file   Social Connections:     Frequency of Communication with Friends and Family: Not on file    Frequency of Social Gatherings with Friends and Family: Not on file    Attends Anabaptist Services: Not on file    Active Member of [address] [place] [address] [employer] or Organizations: Not on file    Attends Club or Organization Meetings: Not on file    Marital Status: Not on file   Intimate Partner Violence:     Fear of Current or Ex-Partner: Not on file    Emotionally Abused: Not on file    Physically Abused: Not on file    Sexually Abused: Not on file   Housing Stability:     Unable to Pay for Housing in the Last Year: Not on file    Number of [place] in the Last Year: Not on file    Unstable Housing in the Last Year: Not on file       Review of Systems   Musculoskeletal:        Post op left knee        Vitals:   There were no vitals taken for this visit. There is no height or weight on file to calculate BMI. Ortho Exam     Left knee: Portal sites are healing well. Patient has minimal to no effusion. Range of motion is 0-40 degrees. Calf is soft and supple. Neurovascular examination intact. ASSESSMENT/PLAN:    Patient is progressing as anticipated. I would recommend physical therapy to advance her range of motion and tolerance of and strength. Patient agrees with this. She was provided a prescription for physical therapy and is to return to the office in 4 weeks.         Sangita Lang MD

## 2022-07-13 ENCOUNTER — OFFICE VISIT (OUTPATIENT)
Dept: ORTHOPEDIC SURGERY | Age: 32
End: 2022-07-13
Payer: COMMERCIAL

## 2022-07-13 DIAGNOSIS — M25.562 LEFT KNEE PAIN, UNSPECIFIED CHRONICITY: ICD-10-CM

## 2022-07-13 DIAGNOSIS — R26.2 DIFFICULTY WALKING: ICD-10-CM

## 2022-07-13 DIAGNOSIS — Z98.890 STATUS POST ARTHROSCOPY OF LEFT KNEE: ICD-10-CM

## 2022-07-13 DIAGNOSIS — M23.42 LOOSE BODY IN KNEE, LEFT KNEE: Primary | ICD-10-CM

## 2022-07-13 PROCEDURE — 97161 PT EVAL LOW COMPLEX 20 MIN: CPT | Performed by: PHYSICAL THERAPIST

## 2022-07-13 PROCEDURE — 97530 THERAPEUTIC ACTIVITIES: CPT | Performed by: PHYSICAL THERAPIST

## 2022-07-13 NOTE — PROGRESS NOTES
@DET@  [place] [phone]  Physical Therapy Evaluation  Patient Name: Nasir Avalos  YAHC:[id number]  : 1990  [x]  Patient  Verified  Prudence Hunt MD  Payor: [employer] / Plan: [employer] [address] / Product Type: PPO /    Total Treatment Time (min): 55  Total Timed Codes (min): 55    Visit #: 1   Referring Provider: Prudence Hunt MD  Treatment Area: left Knee    Subjective: The patient is a 33 y.o. female referred to physical therapy by   Prudence Hunt MD with a diagnosis of left knee pain following loose body removal and arthroscopy. The patient describes a multiyear history of progressive left knee pain with failed conservative treatment. Patient reports she had surgery on  she reports she is continue to have functional loss with ADLs as well as functional return to premorbid activity level. 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 Objective:      Gait: Antalgic gait on the involved lower extremity reduced hip and knee flexion during swing phase, with limited heel strike initial contact. No  ambulatory aid    Balance: Single-leg stance on involved lower extremity at  <3 seconds. Get up and go test: N/A    Range of motion:    AROM-0-8-90 degrees  PROM 0-5- 105 degrees    Strength: Quadriceps: 3- /5 :  Hip abduction: 3- / 5 : HIP Flexion: 3 /5    Soft tissue: restriction is noted of the quadricep, rectus, hamstrings and IT band musculature. Restriction over the incisional sites is noted. Pain: Reported a visual analog scale 2-6/10 activity dependent    Swelling: Patient has mild to moderate increased maira-patellar effusion /swelling. No ecchymosis or bruising is noted around the peripatellar area. Joint mobility assessment: Reduction in tibiofemoral and patellofemoral mobility graded at 2/6 Kaltenborn scale.   Medial and superior restriction    Lower Extremity Functional Scale see chart 31/80    Neurologic screening reveals no focal deficits. Special tests: Negative Homans sign. TREATMENT:  Physical therapy evaluation: 20 minutes  Therapeutic activity instruction 35 minutes    PT Exercise Log         Activity/Exercise  All exercises were supervised with verbal and tactile cues provided were necessary to perform the exercises with 100% accuracy 7/13/2022     Extension prop  x      Quad set x     Supine leg lift   x     Side-lying leg lift   x     Standing hip abduction  X     Passive assisted knee flexion setting as well as with wall slide  X     Standing quad hip flexor stretch x     Supine hamstring stretch x                                                   Assessment:    The patient is status post knee arthroscopy for loose body removal chondroplasty. Physical therapy problems include limitations with range of motion, strength locomotion causing impairments with functional ADLs and recreational lifestyle. The is a good candidate to progress to premorbid level with physical therapy intervention to return the patient to prior level of function. .        1. Loose body in knee, left knee  2. Left knee pain, unspecified chronicity  3. Difficulty walking  4. Status post arthroscopy of left knee        GOALS:    1. Independent demonstration of home exercise program and 3 visits    Short-term goals 3 weeks. 1.  Independent demonstration of a home exercise program.    2.  Patient will have pain reduction by 50%. 3.  Single leg stance at 8 seconds   4. The patient will show 50% improvement in their knee range of motion. Long-term goals 8 weeks. 1.  Community Ambulator(30min) with nonantalgic gait pattern without need for assistive device  2. Ascend and descend 1 flight of stairs without need for handrail or assistive device  3.   The patient will demonstrate knee range of motion 0-0-120 degrees    Physical Therapy Plan of Care    Treatment frequency: 2X-3X per week  Treatment duration: 20 visits     Focus of therapy will be on progressive restoration of range of motion, strength, balance and functional mobility. Therapeutic applications will include but are not limited to:  Home exercise program development and instruction with updating as needed. Manual therapy, joint mobilization, myofascial release, therapeutic exercises for restoration of range of motion to the osteo-kinematic and arthrokinematic limitations. Modalities including ultrasound, electrical stimulation, vasopneumatic compression, heat and ice. Kinesiotaping for joint  for neuromuscular reeducation to reduce pain and and swelling and improve pain around the involved region. Neuromuscular reeducation. Hold relax stretching techniques improve the patient's neuromuscular motor firing pattern of the hip musculature including hip flexors extensors quadriceps hamstrings and rotators  PNF    Postural positional activities for improved joint control and position including NDT techniques      Yosi Reinoso, PT    7/13/2022      The referring physician has reviewed and approved this evaluation and plan of care as noted by the electronic signature attached to note.

## 2022-07-15 ENCOUNTER — OFFICE VISIT (OUTPATIENT)
Dept: ORTHOPEDIC SURGERY | Age: 32
End: 2022-07-15
Payer: COMMERCIAL

## 2022-07-15 DIAGNOSIS — Z98.890 STATUS POST ARTHROSCOPY OF LEFT KNEE: ICD-10-CM

## 2022-07-15 DIAGNOSIS — M25.562 LEFT KNEE PAIN, UNSPECIFIED CHRONICITY: ICD-10-CM

## 2022-07-15 DIAGNOSIS — M23.42 LOOSE BODY IN KNEE, LEFT KNEE: Primary | ICD-10-CM

## 2022-07-15 DIAGNOSIS — R26.2 DIFFICULTY WALKING: ICD-10-CM

## 2022-07-15 PROCEDURE — 97140 MANUAL THERAPY 1/> REGIONS: CPT | Performed by: PHYSICAL THERAPIST

## 2022-07-15 PROCEDURE — 97112 NEUROMUSCULAR REEDUCATION: CPT | Performed by: PHYSICAL THERAPIST

## 2022-07-15 PROCEDURE — 97016 VASOPNEUMATIC DEVICE THERAPY: CPT | Performed by: PHYSICAL THERAPIST

## 2022-07-15 PROCEDURE — 97110 THERAPEUTIC EXERCISES: CPT | Performed by: PHYSICAL THERAPIST

## 2022-07-15 NOTE — PROGRESS NOTES
PT DAILY Progress Note    Patient Name: Chante Roy  7/15/2022  : 1990  [x]  Patient  Verified  Payor: Billy Durbin / [facility] [address] / Product Type: PPO /    Total Treatment Time (min): 70  Total Timed Codes (min): 70      Referring Physician:   Fco Martin MD       1. Loose body in knee, left knee  2. Left knee pain, unspecified chronicity  3. Difficulty walking  4. Status post arthroscopy of left knee      SUBJECTIVE  Subjective functional status/changes:   [] No changes reported    Patient reports she is seeing improvement. She reports compliance with home program she continues report difficulty with prolonged ambulation and stairs    OBJECTIVE/TREATMENT    Manual Therapy x 15 mins:   Manual PROM and mobility restoration to the involved knee was performed to include:    Osteo-and Arthrokinematic joint mobilization and passive range of motion for restoration of normal joint mechanics to allow for improved knee flexion and extension. Grade 1 through 3 mobilization to the patellofemoral and tibiofemoral joints was completed    Techniques including manual, instrument assisted and active release techniques applied to the periarticular soft tissue structures of the knee to normalize swelling, reduce pain, and improve tissue mobility. Neuromuscular Re-education x 30 minutes:  []  Kinesiotaping for   [x]  Neuromuscular reeducation to the VMO with use of [place] electrical stimulation in conjunction with active contraction and exercises.    [x]  balance/proprioceptive exercises and activities in clinic as listed below       Therapeutic Exercise x 15 mins:   Strengthening/Endurance/    EXERCISE X= completed   TKE standing black x   Leg press 45 # x   Slant board x   PROM KNEE FLEXION EXTENSION x    x                   Neuromuscular reeducation    Pregait activities including heel toe rocking  High knee flexion X    x   Hurdles for balance and gait Re ed  X   E-stim with neuromuscular reeducation quad sets straight leg raises short arc quads 15 minutes x                               Added/Changed Exercises:  []  Advanced to address: [] functional strength/ROM deficits [] balance/proprioceptive tasks  []  Modified: [] per subjective reports [] for patient time constraints [] for clinic time constraints    Modality:     [x]  Other: Vasopneumatic cryo compression -Game ready 10 minutes with elevation      Patient Education: [x] Review HEP    [x] Progressed/Changed HEP based on:  [] positioning   [] body mechanics   [] transfers   [] heat/ice application        ASSESSMENT  []  See Plan of Care  []  See progress note/recertification  [x]  Patient will continue to benefit from skilled therapy to address remaining functional deficits:     She is doing well with progressive mobility and strength continues to have some patellar tracking dysfunction and swelling overall continues to improve focus today gait pattern reeducation    Progress towards goals / Updated goals:    PLAN  [x]  Upgrade activities as tolerated      [x]  Continue plan of care  []  Discharge due to:_  [x] Other:_    Continue to advance knee flexion motion strength and function. 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      Jeramy Blue, PT 7/15/2022

## 2022-07-18 ENCOUNTER — OFFICE VISIT (OUTPATIENT)
Dept: ORTHOPEDIC SURGERY | Age: 32
End: 2022-07-18
Payer: COMMERCIAL

## 2022-07-18 DIAGNOSIS — Z98.890 STATUS POST ARTHROSCOPY OF LEFT KNEE: ICD-10-CM

## 2022-07-18 DIAGNOSIS — M23.42 LOOSE BODY IN KNEE, LEFT KNEE: Primary | ICD-10-CM

## 2022-07-18 PROCEDURE — 97110 THERAPEUTIC EXERCISES: CPT

## 2022-07-18 PROCEDURE — 97140 MANUAL THERAPY 1/> REGIONS: CPT

## 2022-07-18 NOTE — Clinical Note
Charges Requiring Review      Description Code Dx Service Date Service Prov Modifiers Qty Status               00285 Therapeutic Exercise w/ GP Modifier 98500 CPT®  07/18/2022 Colonel Linn, PTA GP 2 New    29671 Manual Therapy ea 15min 1+ Region 80159 CPT®  07/18/2022 Colonel Linn, PTA GP 1 New

## 2022-07-18 NOTE — PROGRESS NOTES
Allison Carcamo (: 1990) is a 33 y.o. female patient here for evaluation of the following chief complaint(s):  Knee Pain       Patient Name: Allison Carcamo  Date:2022  : 1990  [] Patient  Verified  Payor: Raúlloren Found / Plan: [employer] / Product Type: PPO /   Total Treatment Time (min): 55   Total Timed Codes (min): 45  1:1 Treatment Time ([address] only):   Visit #: 3  30    ASSESSMENT/PLAN:  Below is the assessment and plan developed based on review of pertinent history, physical exam, labs, studies, and medications. 1. Loose body in knee, left knee  2. Status post arthroscopy of left knee    Patient is compliant with HEP given at initial evaluation and we have added IT band stretches to help with the tenderness noted this session. Continues to have discomfort with TKE, descending stairs, ambulating, and standing for long durations of time. We will work on short term goals next session managing the reduction of daily pain,improving SLS banlance, and making sure that Mena Oro has full range of motion prior to moving to long term goals. Return in about 1 day (around 2022) for continue therapy for knee pain. SUBJECTIVE/OBJECTIVE:    HPI  Continues to have clicking in her knee that is panfull. She stated that is touch and go coming down stairs and she can not stand for an extended period time with out increased pain. Physical Exam    Manual Interventions: (15 Min)   STM/Myofacial release to IT band, scar friction massage, Long asis stretch to left lower extremity, Patellar mobilizations, patellar glides with stretching into flexion and extension at end range. Range of Motion:  Take next session     Measurement/Manual Muscle Testing:      Modalities Applied:   Ice to knee elevated to 90 degrees ( 10 Min )    Therapeutic Exercise: (30 Min)    PT Exercise Log         Activity/Exercise Date  22     Activity/Exercise  All exercises were supervised with verbal and tactile cues provided were necessary to perform the exercises with 100% accuracy       Nu-step [x]       Calf stretch []    Shuttle heel raise     [x]    Shuttle quad     [x] Mini squats   TKE   [x]    Leg Lift back ext   [x]    Leg Lift side   [x]    Short arc quad   []    Treadmill   []    Cup Walk   []    Chair stretch quad   [x]      Hamstring Stretch [x]      Quad set [x] Towel Knee   Ext Prop [x] Towel Heel   Balance board []    Step downs   []      Lower* []    Bungee walk   []    Fitter   []    Elliptical     []      Knee flexion stretch                    []                              []                            []           An electronic signature was used to authenticate this note.   -- Co-treated by MARIAJOSE Chawla

## 2022-07-19 ENCOUNTER — TELEPHONE (OUTPATIENT)
Dept: ORTHOPEDIC SURGERY | Age: 32
End: 2022-07-19

## 2022-07-21 ENCOUNTER — OFFICE VISIT (OUTPATIENT)
Dept: ORTHOPEDIC SURGERY | Age: 32
End: 2022-07-21
Payer: COMMERCIAL

## 2022-07-21 DIAGNOSIS — M25.562 ACUTE PAIN OF LEFT KNEE: ICD-10-CM

## 2022-07-21 DIAGNOSIS — Z98.890 STATUS POST ARTHROSCOPY OF LEFT KNEE: Primary | ICD-10-CM

## 2022-07-21 PROCEDURE — 97140 MANUAL THERAPY 1/> REGIONS: CPT

## 2022-07-21 PROCEDURE — 97110 THERAPEUTIC EXERCISES: CPT

## 2022-07-22 NOTE — PROGRESS NOTES
Itz Donis (: 1990) is a 33 y.o. female patient here for evaluation of the following chief complaint(s):  Knee Pain       Patient Name: Itz Donis  Date:2022  : 1990  [] Patient  Verified  Payor: Megha Cazares / Plan: Phoebe Read / Product Type: PPO /   Total Treatment Time (min): 65   Total Timed Codes (min): 55  1:1 Treatment Time ( [address] only):   Visit #:     ASSESSMENT/PLAN:  Below is the assessment and plan developed based on review of pertinent history, physical exam, labs, studies, and medications. Patient is now walking with a smoother gait pattern. Still has some postoperative effusion which causes her some discomfort at times. She will benefit from continued strengthening and range of motion. I have encouraged her to ride a stationary bike at low resistance on a daily basis. Continue with current plan of care progress towards long-term goals. 1. Status post arthroscopy of left knee  2. Acute pain of left knee         Return in about 1 week (around 2022) for Continued therapy for knee. SUBJECTIVE/OBJECTIVE:    HPI  Knee is still painful and stiff at times. 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 Physical Exam    Manual Interventions: (15 Min)   Retrograde massage to peripatellar and quadriceps. Patellofemoral mobilization. Hamstring stretch. Passive knee flexion and extension with assisted patella mobilization. ACL tape around both medial and lateral patella with lengthening into quadriceps.     Range of Motion:  Take next session     Measurement/Manual Muscle Testing:      Modalities Applied:  Game ready ice posttreatment ( 10 Min )    Therapeutic Exercise: (40 Min)    PT Exercise Log         Activity/Exercise Date  22     Activity/Exercise  All exercises were supervised with verbal and tactile cues provided were necessary to perform the exercises with 100% accuracy       Nu-step [x]       Calf stretch [x]    Shuttle heel raise     [x]    Shuttle quad     [x] Mini squats   TKE   [x]    Leg Lift back ext   [x]    Leg Lift side   [x]    Short arc quad   []    Treadmill   []    Cup Walk   []    Chair stretch quad   [x]      Hamstring Stretch [x]      Quad set [x] Towel Knee   Ext Prop [x] Towel Heel   Balance board []    Step downs   []      Lower* []    Bungee walk   [x]    Fitter   []    Elliptical     []      Knee flexion stretch                    []          Seated wall straight leg raise                    [x]                            []           An electronic signature was used to authenticate this note.   -- Co-treated by MARIAJOSE Amos

## 2022-07-27 ENCOUNTER — DOCUMENTATION ONLY (OUTPATIENT)
Dept: ORTHOPEDIC SURGERY | Age: 32
End: 2022-07-27

## 2022-07-29 NOTE — TELEPHONE ENCOUNTER
Cut the topamax in 1/2 and take 1/2 a pill for the next 2-3 days and then stop taking it.
Patient called stating that she has stopped the Phentermine. She is presently taking 50 mg of Topamax once daily. She is having numbing of the hands and increased confusion. Wonders if should stop Topamax. Does she need to wean off?
Patient has been advised and expressed understanding.
[FreeTextEntry1] : Labs:\par - 06/15/22: s.creat 1.60 (H), Glucose 127 (H), C-reactive protein 0.7\par - 2/11/2022: POCT A1c 6.8%\par - 10/19/21: A1c 7.7%, s.creat 1.56, Ca 9.9, ACR 71, Free T4 1.3, TSH 3.86, LDL-c 49 \par - 10/14/21: s.creat 1.68, Ca 9.5, \par - 8/4/21: POCT A1c 7.2% \par - 07/29/21: s.creat 1.71\par - 04/01/21: s.creat 1.76, LDL-c 51\par - 07/29/20: A1c 6.8%, s.creat 1.81, , LDL-c 45, , Microalb/Creat 75, TSH 3.66, Free T4 1.1\par - 01/07/20: A1c 7.0%, s.creat 1.97, LDL-c 41, , TSH 4.19, Free T4 1.1, Microalb/Creat 184\par - 08/26/19: A1c 6.8%, s.creat 2.13, LDL-c 38, TSH 3.40, Free T4 1.0\par \par Imaging: \par - 12/20/2021: CT Scan of chest, abdomen, and pelvis: The pancreas is mildly atrophic. Small b/l adrenal adenomas are again evident. L measures 1.7 cm and R measures 1.6 cm. It was remeasured similarly on the previous examination. These have not significantly changed. L thyroid nodule

## 2022-08-10 ENCOUNTER — OFFICE VISIT (OUTPATIENT)
Dept: ORTHOPEDIC SURGERY | Age: 32
End: 2022-08-10
Payer: COMMERCIAL

## 2022-08-10 DIAGNOSIS — Z98.890 STATUS POST ARTHROSCOPY OF LEFT KNEE: Primary | ICD-10-CM

## 2022-08-10 PROCEDURE — 99024 POSTOP FOLLOW-UP VISIT: CPT | Performed by: ORTHOPAEDIC SURGERY

## 2022-08-10 NOTE — PROGRESS NOTES
Angie Browne (: 1990) is a 33 y.o. female, patient, here for evaluation of the following chief complaint(s):  Knee Pain (Patient here for post op left knee.)       HPI:    Patient returns to the office now status post left knee arthroscopy. Patient states she is doing well. She has returned back to full duty work. Not on File    Current Outpatient Medications   Medication Sig    phentermine (ADIPEX-P) 37.5 mg tablet Take 0.5 Tablets by mouth every morning. Max Daily Amount: 18.75 mg.    topiramate (TOPAMAX) 50 mg tablet Take 1 Tablet by mouth two (2) times a day. amLODIPine (NORVASC) 10 mg tablet Take 10 mg by mouth daily. SPIRONOLACTONE PO Take  by mouth daily. Unsure of strength    OTHER BCP    metFORMIN (GLUCOPHAGE) 1,000 mg tablet Take 1,000 mg by mouth daily. No current facility-administered medications for this visit.        Past Medical History:   Diagnosis Date    Hypertension     genetic    Morbid obesity with BMI of 40.0-44.9, adult ([facility] [address] 2019    PCOS (polycystic ovarian syndrome) 2019        Past Surgical History:   Procedure Laterality Date    HX HERNIA REPAIR      as an infant       Family History   Problem Relation Age of Onset    Hypertension Mother     Thyroid Disease Mother     Liver Disease Mother         hepatitis    Cancer Father         breast and prostate    Hypertension Father     Crohn's Disease Father     No Known Problems Sister     No Known Problems Brother         ADOPTED    Liver Disease Maternal Grandmother         Hepatitis    Cancer Maternal Grandmother         Breast    Heart defect Maternal Grandfather         aortic aneursym    Hypertension Maternal Grandfather     Other Paternal Grandmother         brain aneurysm    Hypertension Paternal Grandmother     No Known Problems Paternal Grandfather     Cancer Maternal Aunt         Breast    Diabetes Maternal Aunt         Social History     Socioeconomic History    Marital status: SINGLE     Spouse name: Not on file    Number of children: Not on file    Years of education: Not on file    Highest education level: Not on file   Occupational History    Not on file   Tobacco Use    Smoking status: Never    Smokeless tobacco: Never   Substance and Sexual Activity    Alcohol use: Yes     Comment: weekly    Drug use: No    Sexual activity: Not on file   Other Topics Concern    Not on file   Social History Narrative    ** Merged History Encounter **          Social Determinants of Health     Financial Resource Strain: Not on file   Food Insecurity: Not on file   Transportation Needs: Not on file   Physical Activity: Not on file   Stress: Not on file   Social Connections: Not on file   Intimate Partner Violence: Not on file   Housing Stability: Not on file       Review of Systems   Musculoskeletal:         Left knee pain     Vitals: There were no vitals taken for this visit. There is no height or weight on file to calculate BMI. Ortho Exam     Left knee: Portal sites of healed. No effusion. She has full range of motion of her knee. She has no crepitation. She has good quad tone and strength. Neurovascular examination is intact. ASSESSMENT/PLAN:    Patient is doing well. She has already returned back to full duty work. I have gone over home exercise program with the patient. If she has any further problems with the knee, I am happy to see her back.         Nyasia Land MD

## 2023-09-29 ENCOUNTER — TRANSCRIBE ORDERS (OUTPATIENT)
Facility: HOSPITAL | Age: 33
End: 2023-09-29

## 2023-09-29 DIAGNOSIS — Z12.31 SCREENING MAMMOGRAM FOR HIGH-RISK PATIENT: Primary | ICD-10-CM

## 2024-02-29 ENCOUNTER — HOSPITAL ENCOUNTER (OUTPATIENT)
Facility: HOSPITAL | Age: 34
Discharge: HOME | End: 2024-03-03
Attending: SPECIALIST

## 2024-02-29 DIAGNOSIS — Z80.3 FAMILY HISTORY OF MALIGNANT NEOPLASM OF BREAST: ICD-10-CM

## 2024-02-29 DIAGNOSIS — N64.4 MASTODYNIA: ICD-10-CM

## 2024-04-09 LAB
ABO, EXTERNAL RESULT: NORMAL
HEP B, EXTERNAL RESULT: NEGATIVE
HEPATITIS C ANTIBODY, EXTERNAL RESULT: NON REACTIVE
HIV, EXTERNAL RESULT: NON REACTIVE
RH FACTOR, EXTERNAL RESULT: POSITIVE
RPR, EXTERNAL RESULT: NON REACTIVE
RUBELLA TITER, EXTERNAL RESULT: NORMAL

## 2024-08-23 LAB
C. TRACHOMATIS, EXTERNAL RESULT: NEGATIVE
N. GONORRHOEAE, EXTERNAL RESULT: NEGATIVE

## 2024-09-25 ENCOUNTER — HOSPITAL ENCOUNTER (OUTPATIENT)
Facility: HOSPITAL | Age: 34
Setting detail: OBSERVATION
Discharge: HOSPICE | End: 2024-09-26
Attending: SPECIALIST | Admitting: OBSTETRICS & GYNECOLOGY

## 2024-09-25 PROBLEM — O99.891 OTHER SPECIFIED DISEASES AND CONDITIONS COMPLICATING PREGNANCY: Status: ACTIVE | Noted: 2024-09-25

## 2024-09-25 PROBLEM — D50.8 OTHER IRON DEFICIENCY ANEMIAS: Status: ACTIVE | Noted: 2024-09-25
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
## 2024-09-25 PROBLEM — R10.32 ABDOMINAL PAIN, LEFT LOWER QUADRANT: Status: ACTIVE | Noted: 2024-09-25

## 2024-09-25 PROBLEM — Z3A.33 33 WEEKS GESTATION OF PREGNANCY: Status: ACTIVE | Noted: 2024-09-25

## 2024-09-25 PROBLEM — O99.013 ANEMIA COMPLICATING PREGNANCY IN THIRD TRIMESTER: Status: ACTIVE | Noted: 2024-09-25

## 2024-09-25 PROBLEM — O10.013 PRE-EXISTING ESSENTIAL HYPERTENSION COMPLICATING PREGNANCY IN THIRD TRIMESTER: Status: ACTIVE | Noted: 2024-09-25

## 2024-09-25 PROBLEM — I10 ESSENTIAL (PRIMARY) HYPERTENSION: Status: ACTIVE | Noted: 2024-09-25

## 2024-09-25 LAB
ALBUMIN SERPL-MCNC: 2.8 G/DL (ref 3.5–5)
ALBUMIN/GLOB SERPL: 0.7 (ref 1.1–2.2)
ALP SERPL-CCNC: 127 U/L (ref 45–117)
ALT SERPL-CCNC: 22 U/L (ref 12–78)
ANION GAP SERPL CALC-SCNC: 7 MMOL/L (ref 2–12)
AST SERPL-CCNC: 15 U/L (ref 15–37)
BASOPHILS # BLD: 0 K/UL (ref 0–0.1)
BASOPHILS NFR BLD: 0 % (ref 0–1)
BILIRUB SERPL-MCNC: 0.3 MG/DL (ref 0.2–1)
BUN SERPL-MCNC: 6 MG/DL (ref 6–20)
BUN/CREAT SERPL: 10 (ref 12–20)
CALCIUM SERPL-MCNC: 9.2 MG/DL (ref 8.5–10.1)
CHLORIDE SERPL-SCNC: 106 MMOL/L (ref 97–108)
CO2 SERPL-SCNC: 22 MMOL/L (ref 21–32)
CREAT SERPL-MCNC: 0.59 MG/DL (ref 0.55–1.02)
DIFFERENTIAL METHOD BLD: ABNORMAL
EOSINOPHIL # BLD: 0 K/UL (ref 0–0.4)
EOSINOPHIL NFR BLD: 0 % (ref 0–7)
ERYTHROCYTE [DISTWIDTH] IN BLOOD BY AUTOMATED COUNT: 13.7 % (ref 11.5–14.5)
GLOBULIN SER CALC-MCNC: 3.8 G/DL (ref 2–4)
GLUCOSE SERPL-MCNC: 67 MG/DL (ref 65–100)
HCT VFR BLD AUTO: 28.3 % (ref 35–47)
HGB BLD-MCNC: 9.3 G/DL (ref 11.5–16)
HISTORY CHECK: NORMAL
IMM GRANULOCYTES # BLD AUTO: 0.1 K/UL (ref 0–0.04)
IMM GRANULOCYTES NFR BLD AUTO: 1 % (ref 0–0.5)
LYMPHOCYTES # BLD: 2.3 K/UL (ref 0.8–3.5)
LYMPHOCYTES NFR BLD: 22 % (ref 12–49)
MCH RBC QN AUTO: 27.7 PG (ref 26–34)
MCHC RBC AUTO-ENTMCNC: 32.9 G/DL (ref 30–36.5)
MCV RBC AUTO: 84.2 FL (ref 80–99)
MONOCYTES # BLD: 1 K/UL (ref 0–1)
MONOCYTES NFR BLD: 9 % (ref 5–13)
NEUTS SEG # BLD: 7.4 K/UL (ref 1.8–8)
NEUTS SEG NFR BLD: 68 % (ref 32–75)
NRBC # BLD: 0 K/UL (ref 0–0.01)
NRBC BLD-RTO: 0 PER 100 WBC
PLATELET # BLD AUTO: 346 K/UL (ref 150–400)
PMV BLD AUTO: 9.4 FL (ref 8.9–12.9)
POTASSIUM SERPL-SCNC: 3.9 MMOL/L (ref 3.5–5.1)
PROT SERPL-MCNC: 6.6 G/DL (ref 6.4–8.2)
RBC # BLD AUTO: 3.36 M/UL (ref 3.8–5.2)
RETICS # AUTO: 0.06 M/UL (ref 0.02–0.08)
RETICS/RBC NFR AUTO: 1.7 % (ref 0.7–2.1)
SODIUM SERPL-SCNC: 135 MMOL/L (ref 136–145)
WBC # BLD AUTO: 10.7 K/UL (ref 3.6–11)

## 2024-09-25 PROCEDURE — 82746 ASSAY OF FOLIC ACID SERUM: CPT

## 2024-09-25 PROCEDURE — 36415 COLL VENOUS BLD VENIPUNCTURE: CPT

## 2024-09-25 PROCEDURE — 96372 THER/PROPH/DIAG INJ SC/IM: CPT

## 2024-09-25 PROCEDURE — 86850 RBC ANTIBODY SCREEN: CPT

## 2024-09-25 PROCEDURE — 6370000000 HC RX 637 (ALT 250 FOR IP): Performed by: OBSTETRICS & GYNECOLOGY

## 2024-09-25 PROCEDURE — 82607 VITAMIN B-12: CPT

## 2024-09-25 PROCEDURE — 85045 AUTOMATED RETICULOCYTE COUNT: CPT

## 2024-09-25 PROCEDURE — G0379 DIRECT REFER HOSPITAL OBSERV: HCPCS

## 2024-09-25 PROCEDURE — 96361 HYDRATE IV INFUSION ADD-ON: CPT

## 2024-09-25 PROCEDURE — 85025 COMPLETE CBC W/AUTO DIFF WBC: CPT

## 2024-09-25 PROCEDURE — 83540 ASSAY OF IRON: CPT

## 2024-09-25 PROCEDURE — 86900 BLOOD TYPING SEROLOGIC ABO: CPT

## 2024-09-25 PROCEDURE — 6360000002 HC RX W HCPCS: Performed by: OBSTETRICS & GYNECOLOGY

## 2024-09-25 PROCEDURE — 82728 ASSAY OF FERRITIN: CPT

## 2024-09-25 PROCEDURE — 86901 BLOOD TYPING SEROLOGIC RH(D): CPT

## 2024-09-25 PROCEDURE — 94761 N-INVAS EAR/PLS OXIMETRY MLT: CPT

## 2024-09-25 PROCEDURE — 80053 COMPREHEN METABOLIC PANEL: CPT

## 2024-09-25 PROCEDURE — 83550 IRON BINDING TEST: CPT

## 2024-09-25 PROCEDURE — 86923 COMPATIBILITY TEST ELECTRIC: CPT

## 2024-09-25 PROCEDURE — G0378 HOSPITAL OBSERVATION PER HR: HCPCS

## 2024-09-25 PROCEDURE — 2580000003 HC RX 258: Performed by: OBSTETRICS & GYNECOLOGY

## 2024-09-25 RX ORDER — ACETAMINOPHEN 500 MG
1000 TABLET ORAL EVERY 8 HOURS PRN
Status: DISCONTINUED | OUTPATIENT
Start: 2024-09-25 | End: 2024-09-26 | Stop reason: HOSPADM

## 2024-09-25 RX ORDER — OMEPRAZOLE 10 MG/1
10 CAPSULE, DELAYED RELEASE ORAL DAILY
Status: ON HOLD | COMMUNITY
End: 2024-09-26 | Stop reason: HOSPADM

## 2024-09-25 RX ORDER — OXYCODONE HYDROCHLORIDE 5 MG/1
10 TABLET ORAL EVERY 4 HOURS PRN
Status: DISCONTINUED | OUTPATIENT
Start: 2024-09-25 | End: 2024-09-26 | Stop reason: HOSPADM

## 2024-09-25 RX ORDER — LIDOCAINE 4 G/G
1 PATCH TOPICAL DAILY
Status: DISCONTINUED | OUTPATIENT
Start: 2024-09-25 | End: 2024-09-26 | Stop reason: HOSPADM

## 2024-09-25 RX ORDER — ZOLPIDEM TARTRATE 5 MG/1
5 TABLET ORAL NIGHTLY PRN
Status: DISCONTINUED | OUTPATIENT
Start: 2024-09-25 | End: 2024-09-26 | Stop reason: HOSPADM

## 2024-09-25 RX ORDER — BETAMETHASONE SODIUM PHOSPHATE AND BETAMETHASONE ACETATE 3; 3 MG/ML; MG/ML
12 INJECTION, SUSPENSION INTRA-ARTICULAR; INTRALESIONAL; INTRAMUSCULAR; SOFT TISSUE EVERY 24 HOURS
Status: DISCONTINUED | OUTPATIENT
Start: 2024-09-25 | End: 2024-09-26 | Stop reason: HOSPADM

## 2024-09-25 RX ORDER — FERROUS SULFATE 325(65) MG
325 TABLET ORAL
COMMUNITY

## 2024-09-25 RX ORDER — VALACYCLOVIR HYDROCHLORIDE 500 MG/1
500 TABLET, FILM COATED ORAL 2 TIMES DAILY
Status: DISCONTINUED | OUTPATIENT
Start: 2024-09-25 | End: 2024-09-26 | Stop reason: HOSPADM

## 2024-09-25 RX ORDER — SWAB
1 SWAB, NON-MEDICATED MISCELLANEOUS NIGHTLY
Status: DISCONTINUED | OUTPATIENT
Start: 2024-09-25 | End: 2024-09-26 | Stop reason: HOSPADM

## 2024-09-25 RX ORDER — ONDANSETRON 2 MG/ML
4 INJECTION INTRAMUSCULAR; INTRAVENOUS EVERY 6 HOURS PRN
Status: DISCONTINUED | OUTPATIENT
Start: 2024-09-25 | End: 2024-09-26 | Stop reason: HOSPADM

## 2024-09-25 RX ORDER — SODIUM CHLORIDE, SODIUM LACTATE, POTASSIUM CHLORIDE, CALCIUM CHLORIDE 600; 310; 30; 20 MG/100ML; MG/100ML; MG/100ML; MG/100ML
INJECTION, SOLUTION INTRAVENOUS CONTINUOUS
Status: DISCONTINUED | OUTPATIENT
Start: 2024-09-25 | End: 2024-09-26 | Stop reason: HOSPADM

## 2024-09-25 RX ORDER — SODIUM CHLORIDE, SODIUM LACTATE, POTASSIUM CHLORIDE, AND CALCIUM CHLORIDE .6; .31; .03; .02 G/100ML; G/100ML; G/100ML; G/100ML
1000 INJECTION, SOLUTION INTRAVENOUS ONCE
Status: COMPLETED | OUTPATIENT
Start: 2024-09-25 | End: 2024-09-25

## 2024-09-25 RX ORDER — M-VIT,TX,IRON,MINS/CALC/FOLIC 27MG-0.4MG
1 TABLET ORAL DAILY
Status: ON HOLD | COMMUNITY
End: 2024-09-26 | Stop reason: HOSPADM

## 2024-09-25 RX ORDER — AMLODIPINE BESYLATE 5 MG/1
10 TABLET ORAL NIGHTLY
Status: DISCONTINUED | OUTPATIENT
Start: 2024-09-25 | End: 2024-09-26 | Stop reason: HOSPADM

## 2024-09-25 RX ORDER — OXYCODONE HYDROCHLORIDE 5 MG/1
5 TABLET ORAL EVERY 4 HOURS PRN
Status: DISCONTINUED | OUTPATIENT
Start: 2024-09-25 | End: 2024-09-26 | Stop reason: HOSPADM

## 2024-09-25 RX ORDER — ONDANSETRON 4 MG/1
4 TABLET, ORALLY DISINTEGRATING ORAL EVERY 8 HOURS PRN
Status: DISCONTINUED | OUTPATIENT
Start: 2024-09-25 | End: 2024-09-26 | Stop reason: HOSPADM

## 2024-09-25 RX ORDER — SODIUM CHLORIDE 9 MG/ML
INJECTION, SOLUTION INTRAVENOUS PRN
Status: DISCONTINUED | OUTPATIENT
Start: 2024-09-25 | End: 2024-09-26 | Stop reason: HOSPADM

## 2024-09-25 RX ADMIN — BETAMETHASONE SODIUM PHOSPHATE AND BETAMETHASONE ACETATE 12 MG: 3; 3 INJECTION, SUSPENSION INTRA-ARTICULAR; INTRALESIONAL; INTRAMUSCULAR at 18:30

## 2024-09-25 RX ADMIN — AMLODIPINE BESYLATE 10 MG: 5 TABLET ORAL at 22:05

## 2024-09-25 RX ADMIN — VALACYCLOVIR HYDROCHLORIDE 500 MG: 500 TABLET, FILM COATED ORAL at 22:06

## 2024-09-25 RX ADMIN — SODIUM CHLORIDE, POTASSIUM CHLORIDE, SODIUM LACTATE AND CALCIUM CHLORIDE: 600; 310; 30; 20 INJECTION, SOLUTION INTRAVENOUS at 18:01

## 2024-09-25 RX ADMIN — Medication 1 TABLET: at 22:06

## 2024-09-25 RX ADMIN — SODIUM CHLORIDE, POTASSIUM CHLORIDE, SODIUM LACTATE AND CALCIUM CHLORIDE 1000 ML: 600; 310; 30; 20 INJECTION, SOLUTION INTRAVENOUS at 16:48

## 2024-09-25 RX ADMIN — METFORMIN HYDROCHLORIDE 1000 MG: 500 TABLET ORAL at 22:04

## 2024-09-26 ENCOUNTER — HOSPITAL ENCOUNTER (OUTPATIENT)
Facility: HOSPITAL | Age: 34
Discharge: HOME | End: 2024-09-26
Attending: SPECIALIST | Admitting: OBSTETRICS & GYNECOLOGY

## 2024-09-26 VITALS
TEMPERATURE: 98.4 F | RESPIRATION RATE: 16 BRPM | HEART RATE: 93 BPM | SYSTOLIC BLOOD PRESSURE: 128 MMHG | HEIGHT: 64 IN | DIASTOLIC BLOOD PRESSURE: 75 MMHG | OXYGEN SATURATION: 100 % | BODY MASS INDEX: 42.23 KG/M2

## 2024-09-26 LAB
FERRITIN SERPL-MCNC: 5 NG/ML (ref 26–388)
FOLATE SERPL-MCNC: 20.6 NG/ML (ref 5–21)
IRON SATN MFR SERPL: 14 % (ref 20–50)
IRON SERPL-MCNC: 90 UG/DL (ref 35–150)
TIBC SERPL-MCNC: 637 UG/DL (ref 250–450)
VIT B12 SERPL-MCNC: 238 PG/ML (ref 193–986)

## 2024-09-26 PROCEDURE — 2580000003 HC RX 258: Performed by: OBSTETRICS & GYNECOLOGY

## 2024-09-26 PROCEDURE — 96361 HYDRATE IV INFUSION ADD-ON: CPT

## 2024-09-26 PROCEDURE — 6370000000 HC RX 637 (ALT 250 FOR IP): Performed by: OBSTETRICS & GYNECOLOGY

## 2024-09-26 PROCEDURE — G0379 DIRECT REFER HOSPITAL OBSERV: HCPCS

## 2024-09-26 PROCEDURE — 96372 THER/PROPH/DIAG INJ SC/IM: CPT

## 2024-09-26 PROCEDURE — 6360000002 HC RX W HCPCS: Performed by: OBSTETRICS & GYNECOLOGY

## 2024-09-26 PROCEDURE — G0378 HOSPITAL OBSERVATION PER HR: HCPCS

## 2024-09-26 PROCEDURE — 96374 THER/PROPH/DIAG INJ IV PUSH: CPT

## 2024-09-26 PROCEDURE — 6360000002 HC RX W HCPCS: Performed by: SPECIALIST

## 2024-09-26 RX ORDER — BETAMETHASONE SODIUM PHOSPHATE AND BETAMETHASONE ACETATE 3; 3 MG/ML; MG/ML
12 INJECTION, SUSPENSION INTRA-ARTICULAR; INTRALESIONAL; INTRAMUSCULAR; SOFT TISSUE ONCE
Status: COMPLETED | OUTPATIENT
Start: 2024-09-26 | End: 2024-09-26

## 2024-09-26 RX ORDER — DIPHENHYDRAMINE HYDROCHLORIDE 50 MG/ML
25 INJECTION INTRAMUSCULAR; INTRAVENOUS ONCE
Status: COMPLETED | OUTPATIENT
Start: 2024-09-26 | End: 2024-09-26

## 2024-09-26 RX ADMIN — OXYCODONE HYDROCHLORIDE 5 MG: 5 TABLET ORAL at 02:47

## 2024-09-26 RX ADMIN — SODIUM CHLORIDE, POTASSIUM CHLORIDE, SODIUM LACTATE AND CALCIUM CHLORIDE: 600; 310; 30; 20 INJECTION, SOLUTION INTRAVENOUS at 07:50

## 2024-09-26 RX ADMIN — DIPHENHYDRAMINE HYDROCHLORIDE 25 MG: 50 INJECTION INTRAMUSCULAR; INTRAVENOUS at 02:55

## 2024-09-26 RX ADMIN — BETAMETHASONE SODIUM PHOSPHATE AND BETAMETHASONE ACETATE 12 MG: 3; 3 INJECTION, SUSPENSION INTRA-ARTICULAR; INTRALESIONAL; INTRAMUSCULAR at 18:26

## 2024-09-26 ASSESSMENT — PAIN DESCRIPTION - DESCRIPTORS: DESCRIPTORS: ACHING

## 2024-09-26 ASSESSMENT — PAIN DESCRIPTION - LOCATION: LOCATION: BACK;HEAD

## 2024-09-26 ASSESSMENT — PAIN SCALES - GENERAL: PAINLEVEL_OUTOF10: 5

## 2024-09-26 ASSESSMENT — PAIN DESCRIPTION - ORIENTATION: ORIENTATION: LOWER

## 2024-09-26 ASSESSMENT — PAIN - FUNCTIONAL ASSESSMENT: PAIN_FUNCTIONAL_ASSESSMENT: ACTIVITIES ARE NOT PREVENTED

## 2024-09-29 LAB
ABO + RH BLD: NORMAL
BLD PROD TYP BPU: NORMAL
BLD PROD TYP BPU: NORMAL
BLOOD BANK DISPENSE STATUS: NORMAL
BLOOD BANK DISPENSE STATUS: NORMAL
BLOOD GROUP ANTIBODIES SERPL: NORMAL
BPU ID: NORMAL
BPU ID: NORMAL
CROSSMATCH RESULT: NORMAL
CROSSMATCH RESULT: NORMAL
SPECIMEN EXP DATE BLD: NORMAL
UNIT DIVISION: 0
UNIT DIVISION: 0

## 2024-10-16 LAB — GBS, EXTERNAL RESULT: POSITIVE

## 2024-10-28 ENCOUNTER — HOSPITAL ENCOUNTER (OUTPATIENT)
Facility: HOSPITAL | Age: 34
Setting detail: OBSERVATION
Discharge: HOME | End: 2024-10-28
Attending: OBSTETRICS & GYNECOLOGY | Admitting: OBSTETRICS & GYNECOLOGY

## 2024-10-28 VITALS
SYSTOLIC BLOOD PRESSURE: 111 MMHG | DIASTOLIC BLOOD PRESSURE: 56 MMHG | TEMPERATURE: 98.1 F | OXYGEN SATURATION: 97 % | HEART RATE: 93 BPM | RESPIRATION RATE: 16 BRPM

## 2024-10-28 PROBLEM — O47.9 UTERINE CONTRACTIONS: Status: ACTIVE | Noted: 2024-10-28

## 2024-10-28 LAB
ABO + RH BLD: NORMAL
BASOPHILS # BLD: 0 K/UL (ref 0–0.1)
BASOPHILS NFR BLD: 0 % (ref 0–1)
BLOOD GROUP ANTIBODIES SERPL: NORMAL
DIFFERENTIAL METHOD BLD: ABNORMAL
EOSINOPHIL # BLD: 0.1 K/UL (ref 0–0.4)
EOSINOPHIL NFR BLD: 1 % (ref 0–7)
ERYTHROCYTE [DISTWIDTH] IN BLOOD BY AUTOMATED COUNT: 15.9 % (ref 11.5–14.5)
HCT VFR BLD AUTO: 29.7 % (ref 35–47)
HGB BLD-MCNC: 9.8 G/DL (ref 11.5–16)
IMM GRANULOCYTES # BLD AUTO: 0.1 K/UL (ref 0–0.04)
IMM GRANULOCYTES NFR BLD AUTO: 1 % (ref 0–0.5)
LYMPHOCYTES # BLD: 3 K/UL (ref 0.8–3.5)
LYMPHOCYTES NFR BLD: 23 % (ref 12–49)
MCH RBC QN AUTO: 27.3 PG (ref 26–34)
MCHC RBC AUTO-ENTMCNC: 33 G/DL (ref 30–36.5)
MCV RBC AUTO: 82.7 FL (ref 80–99)
MONOCYTES # BLD: 0.9 K/UL (ref 0–1)
MONOCYTES NFR BLD: 7 % (ref 5–13)
NEUTS SEG # BLD: 9.1 K/UL (ref 1.8–8)
NEUTS SEG NFR BLD: 68 % (ref 32–75)
NRBC # BLD: 0 K/UL (ref 0–0.01)
NRBC BLD-RTO: 0 PER 100 WBC
PLATELET # BLD AUTO: 354 K/UL (ref 150–400)
PMV BLD AUTO: 9.5 FL (ref 8.9–12.9)
RBC # BLD AUTO: 3.59 M/UL (ref 3.8–5.2)
RPR SER QL: NONREACTIVE
SPECIMEN EXP DATE BLD: NORMAL
WBC # BLD AUTO: 13.2 K/UL (ref 3.6–11)

## 2024-10-28 PROCEDURE — 85025 COMPLETE CBC W/AUTO DIFF WBC: CPT

## 2024-10-28 PROCEDURE — 86850 RBC ANTIBODY SCREEN: CPT

## 2024-10-28 PROCEDURE — 59025 FETAL NON-STRESS TEST: CPT

## 2024-10-28 PROCEDURE — 36415 COLL VENOUS BLD VENIPUNCTURE: CPT

## 2024-10-28 PROCEDURE — 96372 THER/PROPH/DIAG INJ SC/IM: CPT

## 2024-10-28 PROCEDURE — 6360000002 HC RX W HCPCS

## 2024-10-28 PROCEDURE — 96374 THER/PROPH/DIAG INJ IV PUSH: CPT

## 2024-10-28 PROCEDURE — G0378 HOSPITAL OBSERVATION PER HR: HCPCS

## 2024-10-28 PROCEDURE — 94761 N-INVAS EAR/PLS OXIMETRY MLT: CPT

## 2024-10-28 PROCEDURE — 86901 BLOOD TYPING SEROLOGIC RH(D): CPT

## 2024-10-28 PROCEDURE — 96375 TX/PRO/DX INJ NEW DRUG ADDON: CPT

## 2024-10-28 PROCEDURE — G0379 DIRECT REFER HOSPITAL OBSERV: HCPCS

## 2024-10-28 PROCEDURE — 2500000003 HC RX 250 WO HCPCS

## 2024-10-28 PROCEDURE — 86592 SYPHILIS TEST NON-TREP QUAL: CPT

## 2024-10-28 PROCEDURE — 86900 BLOOD TYPING SEROLOGIC ABO: CPT

## 2024-10-28 RX ORDER — PROCHLORPERAZINE EDISYLATE 5 MG/ML
10 INJECTION INTRAMUSCULAR; INTRAVENOUS ONCE
Status: COMPLETED | OUTPATIENT
Start: 2024-10-28 | End: 2024-10-28

## 2024-10-28 RX ORDER — ONDANSETRON 2 MG/ML
4 INJECTION INTRAMUSCULAR; INTRAVENOUS EVERY 6 HOURS PRN
Status: DISCONTINUED | OUTPATIENT
Start: 2024-10-28 | End: 2024-10-28 | Stop reason: HOSPADM

## 2024-10-28 RX ORDER — TERBUTALINE SULFATE 1 MG/ML
0.25 INJECTION, SOLUTION SUBCUTANEOUS ONCE
Status: COMPLETED | OUTPATIENT
Start: 2024-10-28 | End: 2024-10-28

## 2024-10-28 RX ORDER — ONDANSETRON 4 MG/1
4 TABLET, ORALLY DISINTEGRATING ORAL EVERY 8 HOURS PRN
Status: DISCONTINUED | OUTPATIENT
Start: 2024-10-28 | End: 2024-10-28 | Stop reason: HOSPADM

## 2024-10-28 RX ORDER — TERBUTALINE SULFATE 1 MG/ML
INJECTION, SOLUTION SUBCUTANEOUS
Status: COMPLETED
Start: 2024-10-28 | End: 2024-10-28

## 2024-10-28 RX ORDER — VALACYCLOVIR HYDROCHLORIDE 500 MG/1
500 TABLET, FILM COATED ORAL 2 TIMES DAILY
Status: ON HOLD | COMMUNITY

## 2024-10-28 RX ORDER — AMLODIPINE BESYLATE 10 MG/1
10 TABLET ORAL DAILY
Status: ON HOLD | COMMUNITY

## 2024-10-28 RX ORDER — ACETAMINOPHEN 500 MG
1000 TABLET ORAL EVERY 8 HOURS SCHEDULED
Status: DISCONTINUED | OUTPATIENT
Start: 2024-10-28 | End: 2024-10-28 | Stop reason: HOSPADM

## 2024-10-28 RX ORDER — HYDROMORPHONE HYDROCHLORIDE 1 MG/ML
1 INJECTION, SOLUTION INTRAMUSCULAR; INTRAVENOUS; SUBCUTANEOUS ONCE
Status: COMPLETED | OUTPATIENT
Start: 2024-10-28 | End: 2024-10-28

## 2024-10-28 RX ADMIN — PROCHLORPERAZINE EDISYLATE 10 MG: 5 INJECTION INTRAMUSCULAR; INTRAVENOUS at 03:37

## 2024-10-28 RX ADMIN — HYDROMORPHONE HYDROCHLORIDE 1 MG: 1 INJECTION, SOLUTION INTRAMUSCULAR; INTRAVENOUS; SUBCUTANEOUS at 03:40

## 2024-10-28 RX ADMIN — TERBUTALINE SULFATE 0.25 MG: 1 INJECTION, SOLUTION SUBCUTANEOUS at 03:15

## 2024-10-28 ASSESSMENT — PAIN DESCRIPTION - DESCRIPTORS: DESCRIPTORS: CRAMPING

## 2024-10-28 ASSESSMENT — PAIN SCALES - GENERAL: PAINLEVEL_OUTOF10: 10

## 2024-10-28 NOTE — PROGRESS NOTES
0710: Bedside, Verbal, and Written shift change report given to KORTNEY Valenzuela RN (oncoming nurse) by KARLY Boucher RN (offgoing nurse). Report included the following information Nurse Handoff Report, Index, Adult Overview, Intake/Output, MAR, and Recent Results. Pt currently resting in bed, pt noting no pain. Pt has requested to go home because she is not feeling any discomfort with her contractions.     0715: This RN informing MEENA Ritter of pt request to be discharged home. CNM stating she will be over to discuss POC with pt shortly.    0730: MEENA Ritter at pt bedside discussing POC with patient. Pt requesting to be discharged home at this time because she is no longer feeling painful contractions. CNM educating patient on signs and symptoms that would warrant calling OB or returning to hospital prior to scheduled induction this week. Pt verbalizing understanding and all questions answered.     0737: MEENA Ritter reviewing EFM and confirming reactive NST, pt may be taken off of EFM and be discharged at this time.     0745: This RN reviewing discharge instructions and education with patient. Pt verbalizing understanding and agreement to all teachings. IV access removed. Pt denying vaginal bleeding, leaking of fluid, and confirming feeling fetal movement. Pt to ambulate off of unit with spouse.

## 2024-10-28 NOTE — PROGRESS NOTES
0230 pt returned from walking hallways. Asked to be placed back on the monitor hoping to rest.  0300 SVE no change.  Pt asked to have medication to stop contractions. Orders for Terb given  0315 terb given Stone ROBLEDO at bedside. Pt requested pain medications. Orders given  0330 IV pain medication given with 250ml bolus of LR

## 2024-10-28 NOTE — PROGRESS NOTES
Labor Note    Ligia Dumont  [id number]  1990   38w3d      S:  Requesting to d/c to home, Denies feeling ctx s/p therapeutic rest.    O:    BP (!) 111/56   Pulse 93   Temp 98.1 °F (36.7 °C) (Oral)   Resp 16   SpO2 97%      VSS  SVE: Deferred  FHT reviewed and remains CAT I    A/P:  34 y.o.  @ 38w3d -early labor. Pregnancy course complicated by AMA, chronic uterine irritability, maternal obesity, GDMA2, Hx of HSV (On valtrex), Hx of CHTN (stable on Amlodipine 10mg) and suspected fetal macrosomia.(EFW >80%tile 10/4th).   - GBS +  - CAT I FHT      CNM at bedside for pt evaluation and pt request to d/c to home.  Reviewed pt current symptoms, FHT. Also reviewed recommendations for IOL between 38-39wks r/t CHTN. Pt strongly desires to avoid IOL. CNM verbalized understanding.  Education provided on avoiding further doses of castor oil,  labor precautions, FKC, VB/LOF, pre-eclampsia and when to call primary OB or return to L&D. Pt verbalized understanding and agreeable.  Encouraged daily miles circuit, adequate PO hydration, frequent voiding to reduce uterine irritability.   Encouraged to keep routine prenatal and PNC appt as scheduled or sooner if requested by primary OB.    D/c IV.  D/c to home.    Disposition: Stable, Undelivered.    Signed:  LOBO Pineda

## 2024-10-28 NOTE — PROGRESS NOTES
0640-Patient stating she is feeling better and not feeling contractions and would like to be discharged to home.

## 2024-10-28 NOTE — DISCHARGE INSTRUCTIONS
Early Stage of Labor at Home: Care Instructions  Overview     If you came to the hospital while in early labor, your doctor may ask you to labor at home until your contractions are stronger.  Many women stay at home during early labor. This is often the longest part of the birthing process. It may last up to 2 to 3 days. Contractions are mild to moderate and shorter (about 30 to 45 seconds). You can usually keep talking during them. Contractions may also be irregular, about 5 to 20 minutes apart. They may even stop for a while.  It helps to stay as relaxed as you can during this time. You can spend some or all of your early labor at home or anywhere else you may be comfortable. If you live far from the hospital or birthing center, you may want to think about going somewhere nearby so you can get back to the hospital quickly.  For some women, there may be benefits to staying home during early labor, such as avoiding medicines or procedures.  As labor progresses, you'll shift from early labor to active labor. During this time, contractions get more intense. They occur more often, about every 2 to 3 minutes. They also last longer, about 50 to 70 seconds. You will feel them even when you change positions and walk or move around.  It may be hard to tell if you are in active labor. If you aren't sure, call your doctor or midwife. As your labor progresses, check in with your doctor or midwife about when to come back to the hospital or birthing center. You may have special instructions if your water broke or you tested positive for group B strep.  Follow-up care is a key part of your treatment and safety. Be sure to make and go to all appointments, and call your doctor if you are having problems. It's also a good idea to know your test results and keep a list of the medicines you take.  How can you care for yourself at home?  Get support. Having a support person with you from early labor until after childbirth can have

## 2024-10-28 NOTE — H&P
History and Physical    Patient: Ligia Dumont MRN: [id number]  SSN: [id number]    YOB: 1990  Age: 34 y.o.  Sex: female      Subjective:      Ligia Dumont is a 34 y.o. female who is a  @ 38w3d here with uterine contractions that started at 8PM and have increased in frequency. Reports took castor oil smoothie at 5PM. Endorses +FM. Denies VB/LOF. Also denies HA, new swelling, epigastric pain, and visual changes.  Pregnancy course complicated by AMA, chronic uterine irritability, maternal obesity, GDMA2, Hx of HSV (On valtrex), Hx of CHTN (stable on Amlodipine 10mg) and suspected fetal macrosomia.(EFW >80%tile 10/4th).   GBS is +  See prenatal record for detail.    Past Medical History:   Diagnosis Date    Herpes simplex virus (HSV) infection     Hypertension     genetic    Morbid obesity with BMI of 40.0-44.9, adult 2019    PCOS (polycystic ovarian syndrome) 2019     Past Surgical History:   Procedure Laterality Date    BUNIONECTOMY Left     2014    HERNIA REPAIR      as an infant    KNEE ARTHROSCOPY Left           Family History   Problem Relation Age of Onset    Hypertension Father     Crohn's Disease Father     No Known Problems Sister     No Known Problems Brother         ADOPTED    Liver Disease Maternal Grandmother         Hepatitis    Cancer Maternal Grandmother         Breast    Heart Defect Maternal Grandfather         aortic aneursym    Hypertension Maternal Grandfather     Other Paternal Grandmother         brain aneurysm    Hypertension Paternal Grandmother     No Known Problems Paternal Grandfather     Cancer Maternal Aunt         Breast    Diabetes Maternal Aunt     Cancer Father         breast and prostate    Liver Disease Mother         hepatitis    Hypertension Mother     Thyroid Disease Mother      Social History     Tobacco Use    Smoking status: Never    Smokeless tobacco: Never   Substance Use Topics    Alcohol use: Yes      Prior to Admission

## 2024-10-31 ENCOUNTER — HOSPITAL ENCOUNTER (INPATIENT)
Facility: HOSPITAL | Age: 34
LOS: 5 days | Discharge: HOME | End: 2024-11-05
Attending: SPECIALIST | Admitting: OBSTETRICS & GYNECOLOGY
Payer: COMMERCIAL

## 2024-10-31 DIAGNOSIS — G89.18 POST-OP PAIN: Primary | ICD-10-CM

## 2024-10-31 PROBLEM — Z34.90 ENCOUNTER FOR INDUCTION OF LABOR: Status: ACTIVE | Noted: 2024-10-31

## 2024-10-31 LAB
ABO + RH BLD: NORMAL
BASOPHILS # BLD: 0 K/UL (ref 0–0.1)
BASOPHILS NFR BLD: 0 % (ref 0–1)
BLOOD GROUP ANTIBODIES SERPL: NORMAL
DIFFERENTIAL METHOD BLD: ABNORMAL
EOSINOPHIL # BLD: 0.1 K/UL (ref 0–0.4)
EOSINOPHIL NFR BLD: 0 % (ref 0–7)
ERYTHROCYTE [DISTWIDTH] IN BLOOD BY AUTOMATED COUNT: 15.9 % (ref 11.5–14.5)
HCT VFR BLD AUTO: 30 % (ref 35–47)
HGB BLD-MCNC: 9.7 G/DL (ref 11.5–16)
IMM GRANULOCYTES # BLD AUTO: 0.1 K/UL (ref 0–0.04)
IMM GRANULOCYTES NFR BLD AUTO: 0 % (ref 0–0.5)
LYMPHOCYTES # BLD: 2.9 K/UL (ref 0.8–3.5)
LYMPHOCYTES NFR BLD: 24 % (ref 12–49)
MCH RBC QN AUTO: 26.6 PG (ref 26–34)
MCHC RBC AUTO-ENTMCNC: 32.3 G/DL (ref 30–36.5)
MCV RBC AUTO: 82.4 FL (ref 80–99)
MONOCYTES # BLD: 0.9 K/UL (ref 0–1)
MONOCYTES NFR BLD: 8 % (ref 5–13)
NEUTS SEG # BLD: 8.2 K/UL (ref 1.8–8)
NEUTS SEG NFR BLD: 68 % (ref 32–75)
NRBC # BLD: 0 K/UL (ref 0–0.01)
NRBC BLD-RTO: 0 PER 100 WBC
PLATELET # BLD AUTO: 356 K/UL (ref 150–400)
PMV BLD AUTO: 9.2 FL (ref 8.9–12.9)
RBC # BLD AUTO: 3.64 M/UL (ref 3.8–5.2)
SPECIMEN EXP DATE BLD: NORMAL
WBC # BLD AUTO: 12.1 K/UL (ref 3.6–11)

## 2024-10-31 PROCEDURE — 85025 COMPLETE CBC W/AUTO DIFF WBC: CPT

## 2024-10-31 PROCEDURE — 86850 RBC ANTIBODY SCREEN: CPT

## 2024-10-31 PROCEDURE — 86780 TREPONEMA PALLIDUM: CPT

## 2024-10-31 PROCEDURE — 86900 BLOOD TYPING SEROLOGIC ABO: CPT

## 2024-10-31 PROCEDURE — 86901 BLOOD TYPING SEROLOGIC RH(D): CPT

## 2024-10-31 PROCEDURE — 1100000000 HC RM PRIVATE

## 2024-10-31 PROCEDURE — 36415 COLL VENOUS BLD VENIPUNCTURE: CPT

## 2024-10-31 PROCEDURE — 84156 ASSAY OF PROTEIN URINE: CPT

## 2024-10-31 PROCEDURE — 82570 ASSAY OF URINE CREATININE: CPT

## 2024-10-31 RX ORDER — AMLODIPINE BESYLATE 5 MG/1
10 TABLET ORAL DAILY
Status: DISCONTINUED | OUTPATIENT
Start: 2024-11-01 | End: 2024-11-01

## 2024-10-31 RX ORDER — ACETAMINOPHEN 325 MG/1
650 TABLET ORAL EVERY 4 HOURS PRN
Status: DISCONTINUED | OUTPATIENT
Start: 2024-10-31 | End: 2024-11-05 | Stop reason: HOSPADM

## 2024-10-31 RX ORDER — SODIUM CHLORIDE 9 MG/ML
25 INJECTION, SOLUTION INTRAVENOUS PRN
Status: DISCONTINUED | OUTPATIENT
Start: 2024-10-31 | End: 2024-11-05 | Stop reason: HOSPADM

## 2024-10-31 RX ORDER — TERBUTALINE SULFATE 1 MG/ML
0.25 INJECTION, SOLUTION SUBCUTANEOUS
Status: COMPLETED | OUTPATIENT
Start: 2024-10-31 | End: 2024-11-01

## 2024-10-31 RX ORDER — SODIUM CHLORIDE 0.9 % (FLUSH) 0.9 %
5-40 SYRINGE (ML) INJECTION PRN
Status: DISCONTINUED | OUTPATIENT
Start: 2024-10-31 | End: 2024-11-05 | Stop reason: HOSPADM

## 2024-10-31 RX ORDER — VALACYCLOVIR HYDROCHLORIDE 500 MG/1
500 TABLET, FILM COATED ORAL 2 TIMES DAILY
Status: DISCONTINUED | OUTPATIENT
Start: 2024-11-01 | End: 2024-11-05 | Stop reason: HOSPADM

## 2024-10-31 RX ORDER — SODIUM CHLORIDE 0.9 % (FLUSH) 0.9 %
5-40 SYRINGE (ML) INJECTION EVERY 12 HOURS SCHEDULED
Status: DISCONTINUED | OUTPATIENT
Start: 2024-11-01 | End: 2024-11-05 | Stop reason: HOSPADM

## 2024-11-01 LAB
CREAT UR-MCNC: 172 MG/DL
PROT UR-MCNC: 33 MG/DL (ref 0–11.9)
PROT/CREAT UR-RTO: 0.2

## 2024-11-01 PROCEDURE — 2500000003 HC RX 250 WO HCPCS: Performed by: ADVANCED PRACTICE MIDWIFE

## 2024-11-01 PROCEDURE — 1100000000 HC RM PRIVATE

## 2024-11-01 PROCEDURE — 2500000003 HC RX 250 WO HCPCS: Performed by: OBSTETRICS & GYNECOLOGY

## 2024-11-01 PROCEDURE — 2580000003 HC RX 258: Performed by: ADVANCED PRACTICE MIDWIFE

## 2024-11-01 PROCEDURE — 94761 N-INVAS EAR/PLS OXIMETRY MLT: CPT

## 2024-11-01 PROCEDURE — 6370000000 HC RX 637 (ALT 250 FOR IP): Performed by: ADVANCED PRACTICE MIDWIFE

## 2024-11-01 PROCEDURE — 6360000002 HC RX W HCPCS: Performed by: ADVANCED PRACTICE MIDWIFE

## 2024-11-01 PROCEDURE — 6360000002 HC RX W HCPCS: Performed by: OBSTETRICS & GYNECOLOGY

## 2024-11-01 RX ORDER — TRANEXAMIC ACID 10 MG/ML
1000 INJECTION, SOLUTION INTRAVENOUS
Status: ACTIVE | OUTPATIENT
Start: 2024-11-01 | End: 2024-11-02

## 2024-11-01 RX ORDER — METHYLERGONOVINE MALEATE 0.2 MG/ML
200 INJECTION INTRAVENOUS PRN
Status: DISCONTINUED | OUTPATIENT
Start: 2024-11-01 | End: 2024-11-05 | Stop reason: HOSPADM

## 2024-11-01 RX ORDER — CARBOPROST TROMETHAMINE 250 UG/ML
250 INJECTION, SOLUTION INTRAMUSCULAR PRN
Status: DISCONTINUED | OUTPATIENT
Start: 2024-11-01 | End: 2024-11-05 | Stop reason: HOSPADM

## 2024-11-01 RX ORDER — AMLODIPINE BESYLATE 5 MG/1
10 TABLET ORAL NIGHTLY
Status: DISCONTINUED | OUTPATIENT
Start: 2024-11-01 | End: 2024-11-05 | Stop reason: HOSPADM

## 2024-11-01 RX ORDER — HYDROMORPHONE HYDROCHLORIDE 1 MG/ML
1 INJECTION, SOLUTION INTRAMUSCULAR; INTRAVENOUS; SUBCUTANEOUS
Status: COMPLETED | OUTPATIENT
Start: 2024-11-01 | End: 2024-11-01

## 2024-11-01 RX ORDER — SODIUM CHLORIDE, SODIUM LACTATE, POTASSIUM CHLORIDE, AND CALCIUM CHLORIDE .6; .31; .03; .02 G/100ML; G/100ML; G/100ML; G/100ML
500 INJECTION, SOLUTION INTRAVENOUS PRN
Status: DISCONTINUED | OUTPATIENT
Start: 2024-11-01 | End: 2024-11-05 | Stop reason: HOSPADM

## 2024-11-01 RX ORDER — ONDANSETRON 2 MG/ML
4 INJECTION INTRAMUSCULAR; INTRAVENOUS EVERY 6 HOURS PRN
Status: DISCONTINUED | OUTPATIENT
Start: 2024-11-01 | End: 2024-11-05 | Stop reason: HOSPADM

## 2024-11-01 RX ORDER — ONDANSETRON 4 MG/1
4 TABLET, ORALLY DISINTEGRATING ORAL EVERY 6 HOURS PRN
Status: DISCONTINUED | OUTPATIENT
Start: 2024-11-01 | End: 2024-11-05 | Stop reason: HOSPADM

## 2024-11-01 RX ORDER — PROCHLORPERAZINE EDISYLATE 5 MG/ML
5 INJECTION INTRAMUSCULAR; INTRAVENOUS EVERY 6 HOURS PRN
Status: DISCONTINUED | OUTPATIENT
Start: 2024-11-01 | End: 2024-11-01

## 2024-11-01 RX ORDER — MISOPROSTOL 200 UG/1
400 TABLET ORAL PRN
Status: DISCONTINUED | OUTPATIENT
Start: 2024-11-01 | End: 2024-11-05 | Stop reason: HOSPADM

## 2024-11-01 RX ORDER — PROCHLORPERAZINE EDISYLATE 5 MG/ML
10 INJECTION INTRAMUSCULAR; INTRAVENOUS EVERY 6 HOURS PRN
Status: DISCONTINUED | OUTPATIENT
Start: 2024-11-01 | End: 2024-11-05 | Stop reason: HOSPADM

## 2024-11-01 RX ORDER — SODIUM CHLORIDE, SODIUM LACTATE, POTASSIUM CHLORIDE, CALCIUM CHLORIDE 600; 310; 30; 20 MG/100ML; MG/100ML; MG/100ML; MG/100ML
INJECTION, SOLUTION INTRAVENOUS CONTINUOUS
Status: DISCONTINUED | OUTPATIENT
Start: 2024-11-01 | End: 2024-11-05 | Stop reason: HOSPADM

## 2024-11-01 RX ORDER — HYDROMORPHONE HYDROCHLORIDE 1 MG/ML
1 INJECTION, SOLUTION INTRAMUSCULAR; INTRAVENOUS; SUBCUTANEOUS
Status: COMPLETED | OUTPATIENT
Start: 2024-11-01 | End: 2024-11-02

## 2024-11-01 RX ADMIN — HYDROMORPHONE HYDROCHLORIDE 1 MG: 1 INJECTION, SOLUTION INTRAMUSCULAR; INTRAVENOUS; SUBCUTANEOUS at 22:24

## 2024-11-01 RX ADMIN — PROCHLORPERAZINE EDISYLATE 10 MG: 5 INJECTION INTRAMUSCULAR; INTRAVENOUS at 22:21

## 2024-11-01 RX ADMIN — AMLODIPINE BESYLATE 10 MG: 5 TABLET ORAL at 20:59

## 2024-11-01 RX ADMIN — HYDROMORPHONE HYDROCHLORIDE 1 MG: 1 INJECTION, SOLUTION INTRAMUSCULAR; INTRAVENOUS; SUBCUTANEOUS at 09:42

## 2024-11-01 RX ADMIN — SODIUM CHLORIDE 2.5 MILLION UNITS: 9 INJECTION, SOLUTION INTRAVENOUS at 21:09

## 2024-11-01 RX ADMIN — PENICILLIN G POTASSIUM 5 MILLION UNITS: 5000000 INJECTION, POWDER, FOR SOLUTION INTRAMUSCULAR; INTRAVENOUS at 07:38

## 2024-11-01 RX ADMIN — VALACYCLOVIR HYDROCHLORIDE 500 MG: 500 TABLET, FILM COATED ORAL at 08:53

## 2024-11-01 RX ADMIN — PROCHLORPERAZINE EDISYLATE 5 MG: 5 INJECTION INTRAMUSCULAR; INTRAVENOUS at 06:33

## 2024-11-01 RX ADMIN — SODIUM CHLORIDE 2.5 MILLION UNITS: 9 INJECTION, SOLUTION INTRAVENOUS at 15:36

## 2024-11-01 RX ADMIN — Medication 25 MCG: at 00:35

## 2024-11-01 RX ADMIN — TERBUTALINE SULFATE 0.25 MG: 1 INJECTION, SOLUTION SUBCUTANEOUS at 05:41

## 2024-11-01 RX ADMIN — SODIUM CHLORIDE 2.5 MILLION UNITS: 9 INJECTION, SOLUTION INTRAVENOUS at 11:43

## 2024-11-01 RX ADMIN — HYDROMORPHONE HYDROCHLORIDE 1 MG: 1 INJECTION, SOLUTION INTRAMUSCULAR; INTRAVENOUS; SUBCUTANEOUS at 12:48

## 2024-11-01 RX ADMIN — PROCHLORPERAZINE EDISYLATE 5 MG: 5 INJECTION INTRAMUSCULAR; INTRAVENOUS at 12:50

## 2024-11-01 RX ADMIN — VALACYCLOVIR HYDROCHLORIDE 500 MG: 500 TABLET, FILM COATED ORAL at 21:00

## 2024-11-01 RX ADMIN — HYDROMORPHONE HYDROCHLORIDE 1 MG: 1 INJECTION, SOLUTION INTRAMUSCULAR; INTRAVENOUS; SUBCUTANEOUS at 06:34

## 2024-11-01 ASSESSMENT — PAIN SCALES - GENERAL
PAINLEVEL_OUTOF10: 5
PAINLEVEL_OUTOF10: 7
PAINLEVEL_OUTOF10: 7

## 2024-11-01 ASSESSMENT — PAIN DESCRIPTION - LOCATION
LOCATION: ABDOMEN
LOCATION: BACK
LOCATION: ABDOMEN

## 2024-11-01 ASSESSMENT — PAIN DESCRIPTION - ORIENTATION
ORIENTATION: ANTERIOR;LOWER
ORIENTATION: POSTERIOR

## 2024-11-01 ASSESSMENT — PAIN DESCRIPTION - DESCRIPTORS
DESCRIPTORS: CRAMPING;SORE;SHARP
DESCRIPTORS: CRAMPING
DESCRIPTORS: CRAMPING

## 2024-11-01 ASSESSMENT — PAIN - FUNCTIONAL ASSESSMENT
PAIN_FUNCTIONAL_ASSESSMENT: ACTIVITIES ARE NOT PREVENTED
PAIN_FUNCTIONAL_ASSESSMENT: ACTIVITIES ARE NOT PREVENTED

## 2024-11-01 NOTE — PROGRESS NOTES
L&D Progress Note    Admit Date: 10/31/2024      Subjective:     Patient has no complaint. Desires to continue IOL. Agrees to cook balloon.       Objective:     Patient Vitals for the past 8 hrs:   BP Temp Temp src Pulse Resp   11/01/24 0740 (!) 107/55 97.8 °F (36.6 °C) Oral 99 16     No intake/output data recorded.  No intake/output data recorded.    Current Facility-Administered Medications   Medication Dose Route Frequency    lactated ringers infusion   IntraVENous Continuous    lactated ringers bolus 500 mL  500 mL IntraVENous PRN    Or    lactated ringers bolus 500 mL  500 mL IntraVENous PRN    ondansetron (ZOFRAN) injection 4 mg  4 mg IntraVENous Q6H PRN    Or    ondansetron (ZOFRAN-ODT) disintegrating tablet 4 mg  4 mg Oral Q6H PRN    oxytocin (PITOCIN) 30 units in 500 mL infusion  87.3 titi-units/min IntraVENous Continuous PRN    And    oxytocin (PITOCIN) 10 unit bolus from the bag  10 Units IntraVENous PRN    methylergonovine (METHERGINE) injection 200 mcg  200 mcg IntraMUSCular PRN    carboprost (HEMABATE) injection 250 mcg  250 mcg IntraMUSCular PRN    miSOPROStol (CYTOTEC) tablet 400 mcg  400 mcg Buccal PRN    tranexamic acid-NaCl IVPB premix 1,000 mg  1,000 mg IntraVENous Once PRN    benzocaine-menthol (DERMOPLAST) 20-0.5 % spray   Topical PRN    HYDROmorphone HCl PF (DILAUDID) injection 1 mg  1 mg IntraVENous Q3H PRN    prochlorperazine (COMPAZINE) injection 5 mg  5 mg IntraVENous Q6H PRN    penicillin G potassium 2.5 million units in 0.9% sodium chloride 100 mL IVPB  2.5 Million Units IntraVENous Q4H    amLODIPine (NORVASC) tablet 10 mg  10 mg Oral Nightly    miSOPROStol (CYTOTEC) pre-split tablet TABS 25 mcg  25 mcg Oral Q2H    sodium chloride flush 0.9 % injection 5-40 mL  5-40 mL IntraVENous 2 times per day    sodium chloride flush 0.9 % injection 5-40 mL  5-40 mL IntraVENous PRN    0.9 % sodium chloride infusion  25 mL IntraVENous PRN    acetaminophen (TYLENOL) tablet 650 mg  650 mg Oral Q4H PRN

## 2024-11-01 NOTE — PROGRESS NOTES
1900: Bedside and Verbal shift change report given to LACHELLE Roa (oncoming nurse) by Smooth Garcia RN (offgoing nurse). Report included the following information Nurse Handoff Report, Intake/Output, MAR, Recent Results, and Quality Measures.      1920: Patient removed from monitor to shower. Patient reports cramping and discomfort from cook catheter.    2147: MEENA Smith at bedside.    2155: Cook catheter assessed by MEENA Smith. Plan to leave balloon in place until 0200, then begin Oxytocin. Patient states she will decide if she is amenable to Oxytocin at 0200.    0216: Cook catheter deflated and removed, patient tolerated well. Patient states she would like to begin Oxytocin at 0300.    0230: MEENA Smith updated of patient status.    0511: Ashley Neil RN overseeing patient while this RN assists with epidural placement for another patient. Care assumed at this time.    0700: Bedside and Verbal shift change report given to LACHELLE Ritter (oncoming nurse) by LACHELLE Roa (offgoing nurse). Report included the following information Nurse Handoff Report, Intake/Output, MAR, Recent Results, and Quality Measures.

## 2024-11-01 NOTE — PROGRESS NOTES
2115: Pt admitted to unit for scheduled induction. Pt denies HA blurred vision, NV, epigastric pain, loss of vaginal fluids or blood.    2200: NST reactive. MD reviewed.    2210: MD at bedside discussing induction options and addressing concerns regarding starting the induction. MD okay with pt coming off EFM until starting cytotec.    2350: CNM at bedside going over process for induction. Opportunity given for pt at ask questions.    0002: SVE done at this time (1/60/-3).     0036: IVFB started at this time due to uterine irritability.     0447: This RN calling CNM at this time regarding contraction frequency. CNM to come assess.    0455: CNM at bedside assessing pt.    0458: SVE done at this time (1/60/-3).    0541: Terbutaline given for tachysystole.    0605: Jason MCKEE at bedside.    0608: SVE done at this time (1/60/-3).    0700: Bedside and Verbal shift change report given to ARELY Duron RN (oncoming nurse) by ARELY Zhao RN (offgoing nurse). Report included the following information Nurse Handoff Report, Index, Intake/Output, MAR, and Recent Results.

## 2024-11-01 NOTE — PROGRESS NOTES
PN  C/o ctx  Vss/afeb  FHT: reactive  [place]: irreg ctx  VE1-2/50/-2  A/p PIH IOL  Start abx  Expectant mgt

## 2024-11-01 NOTE — H&P
History and Physical    Patient: Ligia Dumont MRN: [id number]  SSN: [id number]    YOB: 1990  Age: 34 y.o.  Sex: female      Subjective:      Ligia Dumont is a 34 y.o. female who is a  at 39.0 weeks gestation ( ELBERT 2024) who came in for a Medical Induction due to Current hx of CHTN and Maternal Obesity.     Past Medical History:   Diagnosis Date    Herpes simplex virus (HSV) infection     Hypertension     genetic    Morbid obesity with BMI of 40.0-44.9, adult 2019    PCOS (polycystic ovarian syndrome) 2019     Past Surgical History:   Procedure Laterality Date    BUNIONECTOMY Left         HERNIA REPAIR      as an infant    KNEE ARTHROSCOPY Left           Family History   Problem Relation Age of Onset    Hypertension Father     Crohn's Disease Father     No Known Problems Sister     No Known Problems Brother         ADOPTED    Liver Disease Maternal Grandmother         Hepatitis    Cancer Maternal Grandmother         Breast    Heart Defect Maternal Grandfather         aortic aneursym    Hypertension Maternal Grandfather     Other Paternal Grandmother         brain aneurysm    Hypertension Paternal Grandmother     No Known Problems Paternal Grandfather     Cancer Maternal Aunt         Breast    Diabetes Maternal Aunt     Cancer Father         breast and prostate    Liver Disease Mother         hepatitis    Hypertension Mother     Thyroid Disease Mother      Social History     Tobacco Use    Smoking status: Never    Smokeless tobacco: Never   Substance Use Topics    Alcohol use: Yes      Prior to Admission medications    Medication Sig Start Date End Date Taking? Authorizing Provider   amLODIPine (NORVASC) 10 MG tablet Take 1 tablet by mouth daily   Yes ProviderNatty MD   valACYclovir (VALTREX) 500 MG tablet Take 1 tablet by mouth 2 times daily   Yes Natty Cruz MD   ferrous sulfate (IRON 325) 325 (65 Fe) MG tablet Take 1 tablet by mouth daily

## 2024-11-01 NOTE — PROGRESS NOTES
0700: SBAR report received from ARELY Zhao RN.    0908: Dr. Mclaughlin at bedside reviewing plan of care for induction with patient. Pt verbalized plan for induction is to proceed with inserting cook catheter. Risks/benefits reviewed. Pt consents to proceeding with cook catheter for induction at this time.     0930: VORB from Dr. Mclaughlin that patient may come off the monitor and ambulate. NST as needed.     1137: Dr. Mclaughlin informed pt reports constant vaginal pain/pressure and lower back pain. Unable to tolerate vaginal balloon. VORB to decrease vaginal balloon to 20cc.    1140: Vaginal balloon reduced to 20cc.     1340: Pt resting comfortably with even respirations.     1621: EFM removed so patient may be ambulatory.    1808: Pt placed back on EFM after ambulating. Pt reports feeling contractions regularly.    1908: SBAR report given to KARLY Roa RN

## 2024-11-02 ENCOUNTER — ANESTHESIA EVENT (OUTPATIENT)
Facility: HOSPITAL | Age: 34
End: 2024-11-02

## 2024-11-02 ENCOUNTER — ANESTHESIA (OUTPATIENT)
Facility: HOSPITAL | Age: 34
End: 2024-11-02

## 2024-11-02 PROCEDURE — 10907ZC DRAINAGE OF AMNIOTIC FLUID, THERAPEUTIC FROM PRODUCTS OF CONCEPTION, VIA NATURAL OR ARTIFICIAL OPENING: ICD-10-PCS | Performed by: STUDENT IN AN ORGANIZED HEALTH CARE EDUCATION/TRAINING PROGRAM

## 2024-11-02 PROCEDURE — 1100000000 HC RM PRIVATE

## 2024-11-02 PROCEDURE — 6360000002 HC RX W HCPCS: Performed by: ANESTHESIOLOGY

## 2024-11-02 PROCEDURE — 3700000025 EPIDURAL BLOCK: Performed by: ANESTHESIOLOGY

## 2024-11-02 PROCEDURE — 2500000003 HC RX 250 WO HCPCS: Performed by: ANESTHESIOLOGY

## 2024-11-02 PROCEDURE — 94761 N-INVAS EAR/PLS OXIMETRY MLT: CPT

## 2024-11-02 PROCEDURE — 2580000003 HC RX 258: Performed by: ADVANCED PRACTICE MIDWIFE

## 2024-11-02 PROCEDURE — 6370000000 HC RX 637 (ALT 250 FOR IP): Performed by: ADVANCED PRACTICE MIDWIFE

## 2024-11-02 PROCEDURE — 6360000002 HC RX W HCPCS: Performed by: OBSTETRICS & GYNECOLOGY

## 2024-11-02 PROCEDURE — 6360000002 HC RX W HCPCS: Performed by: ADVANCED PRACTICE MIDWIFE

## 2024-11-02 RX ORDER — LIDOCAINE HYDROCHLORIDE AND EPINEPHRINE 15; 5 MG/ML; UG/ML
INJECTION, SOLUTION EPIDURAL
Status: DISCONTINUED | OUTPATIENT
Start: 2024-11-02 | End: 2024-11-03 | Stop reason: SDUPTHER

## 2024-11-02 RX ORDER — EPHEDRINE SULFATE/0.9% NACL/PF 50 MG/5 ML
10 SYRINGE (ML) INTRAVENOUS EVERY 5 MIN PRN
Status: DISCONTINUED | OUTPATIENT
Start: 2024-11-02 | End: 2024-11-05 | Stop reason: HOSPADM

## 2024-11-02 RX ORDER — FENTANYL/BUPIVACAINE/NS/PF 2-1250MCG
12 PLASTIC BAG, INJECTION (ML) INJECTION CONTINUOUS
Status: DISCONTINUED | OUTPATIENT
Start: 2024-11-02 | End: 2024-11-02

## 2024-11-02 RX ORDER — NALOXONE HYDROCHLORIDE 0.4 MG/ML
INJECTION, SOLUTION INTRAMUSCULAR; INTRAVENOUS; SUBCUTANEOUS PRN
Status: DISCONTINUED | OUTPATIENT
Start: 2024-11-02 | End: 2024-11-03

## 2024-11-02 RX ORDER — ACETAMINOPHEN 500 MG
1000 TABLET ORAL ONCE
Status: COMPLETED | OUTPATIENT
Start: 2024-11-02 | End: 2024-11-02

## 2024-11-02 RX ORDER — ONDANSETRON 2 MG/ML
4 INJECTION INTRAMUSCULAR; INTRAVENOUS EVERY 6 HOURS PRN
Status: DISCONTINUED | OUTPATIENT
Start: 2024-11-02 | End: 2024-11-03

## 2024-11-02 RX ORDER — FENTANYL/BUPIVACAINE/NS/PF 2-1250MCG
12 PLASTIC BAG, INJECTION (ML) INJECTION CONTINUOUS
Status: DISCONTINUED | OUTPATIENT
Start: 2024-11-02 | End: 2024-11-03

## 2024-11-02 RX ADMIN — SODIUM CHLORIDE, POTASSIUM CHLORIDE, SODIUM LACTATE AND CALCIUM CHLORIDE: 600; 310; 30; 20 INJECTION, SOLUTION INTRAVENOUS at 12:57

## 2024-11-02 RX ADMIN — Medication 12 ML/HR: at 19:51

## 2024-11-02 RX ADMIN — LIDOCAINE HYDROCHLORIDE AND EPINEPHRINE 5 ML: 15; 5 INJECTION, SOLUTION EPIDURAL; INFILTRATION; INTRACAUDAL; PERINEURAL at 19:18

## 2024-11-02 RX ADMIN — SODIUM CHLORIDE, POTASSIUM CHLORIDE, SODIUM LACTATE AND CALCIUM CHLORIDE 125 ML/HR: 600; 310; 30; 20 INJECTION, SOLUTION INTRAVENOUS at 03:10

## 2024-11-02 RX ADMIN — SODIUM CHLORIDE, POTASSIUM CHLORIDE, SODIUM LACTATE AND CALCIUM CHLORIDE: 600; 310; 30; 20 INJECTION, SOLUTION INTRAVENOUS at 19:11

## 2024-11-02 RX ADMIN — SODIUM CHLORIDE 2.5 MILLION UNITS: 9 INJECTION, SOLUTION INTRAVENOUS at 21:13

## 2024-11-02 RX ADMIN — SODIUM CHLORIDE, PRESERVATIVE FREE 10 ML: 5 INJECTION INTRAVENOUS at 08:08

## 2024-11-02 RX ADMIN — SODIUM CHLORIDE 2.5 MILLION UNITS: 9 INJECTION, SOLUTION INTRAVENOUS at 04:46

## 2024-11-02 RX ADMIN — PROCHLORPERAZINE EDISYLATE 10 MG: 5 INJECTION INTRAMUSCULAR; INTRAVENOUS at 14:31

## 2024-11-02 RX ADMIN — SODIUM CHLORIDE, POTASSIUM CHLORIDE, SODIUM LACTATE AND CALCIUM CHLORIDE: 600; 310; 30; 20 INJECTION, SOLUTION INTRAVENOUS at 18:01

## 2024-11-02 RX ADMIN — SODIUM CHLORIDE 2.5 MILLION UNITS: 9 INJECTION, SOLUTION INTRAVENOUS at 08:52

## 2024-11-02 RX ADMIN — SODIUM CHLORIDE, POTASSIUM CHLORIDE, SODIUM LACTATE AND CALCIUM CHLORIDE: 600; 310; 30; 20 INJECTION, SOLUTION INTRAVENOUS at 08:08

## 2024-11-02 RX ADMIN — SODIUM CHLORIDE 2.5 MILLION UNITS: 9 INJECTION, SOLUTION INTRAVENOUS at 13:02

## 2024-11-02 RX ADMIN — SODIUM CHLORIDE 2.5 MILLION UNITS: 9 INJECTION, SOLUTION INTRAVENOUS at 17:26

## 2024-11-02 RX ADMIN — VALACYCLOVIR HYDROCHLORIDE 500 MG: 500 TABLET, FILM COATED ORAL at 22:50

## 2024-11-02 RX ADMIN — VALACYCLOVIR HYDROCHLORIDE 500 MG: 500 TABLET, FILM COATED ORAL at 08:51

## 2024-11-02 RX ADMIN — Medication 2 MILLI-UNITS/MIN: at 03:11

## 2024-11-02 RX ADMIN — ACETAMINOPHEN 1000 MG: 500 TABLET ORAL at 22:50

## 2024-11-02 RX ADMIN — SODIUM CHLORIDE, SODIUM LACTATE, POTASSIUM CHLORIDE, AND CALCIUM CHLORIDE 1000 ML: .6; .31; .03; .02 INJECTION, SOLUTION INTRAVENOUS at 22:51

## 2024-11-02 RX ADMIN — SODIUM CHLORIDE 2.5 MILLION UNITS: 9 INJECTION, SOLUTION INTRAVENOUS at 02:12

## 2024-11-02 RX ADMIN — Medication 12 ML/HR: at 23:38

## 2024-11-02 RX ADMIN — LIDOCAINE HYDROCHLORIDE AND EPINEPHRINE 3 ML: 15; 5 INJECTION, SOLUTION EPIDURAL; INFILTRATION; INTRACAUDAL; PERINEURAL at 19:13

## 2024-11-02 RX ADMIN — AMLODIPINE BESYLATE 10 MG: 5 TABLET ORAL at 20:23

## 2024-11-02 RX ADMIN — HYDROMORPHONE HYDROCHLORIDE 1 MG: 1 INJECTION, SOLUTION INTRAMUSCULAR; INTRAVENOUS; SUBCUTANEOUS at 14:30

## 2024-11-02 ASSESSMENT — PAIN SCALES - GENERAL: PAINLEVEL_OUTOF10: 7

## 2024-11-02 ASSESSMENT — PAIN DESCRIPTION - LOCATION: LOCATION: ABDOMEN

## 2024-11-02 ASSESSMENT — PAIN - FUNCTIONAL ASSESSMENT: PAIN_FUNCTIONAL_ASSESSMENT: PREVENTS OR INTERFERES SOME ACTIVE ACTIVITIES AND ADLS

## 2024-11-02 ASSESSMENT — PAIN DESCRIPTION - ORIENTATION: ORIENTATION: ANTERIOR

## 2024-11-02 ASSESSMENT — PAIN DESCRIPTION - DESCRIPTORS: DESCRIPTORS: CRAMPING

## 2024-11-02 NOTE — PLAN OF CARE
Problem: Safety - Adult  Goal: Free from fall injury  11/2/2024 0814 by Lupe Ritter RN  Outcome: Progressing  11/1/2024 2004 by Brook Roa RN  Outcome: [facility] Progressing  Flowsheets (Taken 11/1/2024 1917)  Free From Fall Injury: Instruct family/caregiver on patient safety     Problem: Chronic Conditions and Co-morbidities  Goal: Patient's chronic conditions and co-morbidity symptoms are monitored and maintained or improved  11/2/2024 0814 by Lupe Ritter RN  Outcome: Progressing  11/1/2024 2004 by Brook Roa RN  Outcome: /[facility] Progressing  Flowsheets (Taken 11/1/2024 1917)  Care Plan - Patient's Chronic Conditions and Co-Morbidity Symptoms are Monitored and Maintained or Improved:   Monitor and assess patient's chronic conditions and comorbid symptoms for stability, deterioration, or improvement   Collaborate with multidisciplinary team to address chronic and comorbid conditions and prevent exacerbation or deterioration

## 2024-11-02 NOTE — ANESTHESIA PRE PROCEDURE
Department of Anesthesiology  Preprocedure Note       Name:  Ligia Dumont   Age:  34 y.o.  :  1990                                          MRN:  [id number]         Date:  2024      Surgeon: * No surgeons listed *    Procedure: * No procedures listed *    Medications prior to admission:   Prior to Admission medications    Medication Sig Start Date End Date Taking? Authorizing Provider   amLODIPine (NORVASC) 10 MG tablet Take 1 tablet by mouth daily   Yes Provider, MD Natty   valACYclovir (VALTREX) 500 MG tablet Take 1 tablet by mouth 2 times daily   Yes Provider, Historical, MD   ferrous sulfate (IRON 325) 325 (65 Fe) MG tablet Take 1 tablet by mouth daily (with breakfast)   Yes Provider, Historical, MD   metFORMIN (GLUCOPHAGE) 1000 MG tablet Take 2 tablets by mouth daily   Yes Automatic Reconciliation, Ar       Current medications:    Current Facility-Administered Medications   Medication Dose Route Frequency Provider Last Rate Last Admin   • penicillin G potassium 2.5 million units in 0.9% sodium chloride 100 mL IVPB  2.5 Million Units IntraVENous Q4H Laura Smith APRN - CNM   Stopped at 24 175   • fentaNYL 2 mcg/mL BUPivacaine 0.125% in sodium chloride 0.9% 100 mL epidural infusion  12 mL/hr Epidural Continuous Minh Parra MD       • naloxone 0.4 mg in 10 mL sodium chloride syringe   IntraVENous PRN Minh Parra MD       • ondansetron (ZOFRAN) injection 4 mg  4 mg IntraVENous Q6H PRN Minh Parra MD       • ePHEDrine injection 10 mg  10 mg IntraVENous Q5 Min PRN Minh Parra MD       • lactated ringers infusion   IntraVENous Continuous Maria Elena Lira APRN -  mL/hr at 24 New Bag at 24   • lactated ringers bolus 500 mL  500 mL IntraVENous PRN Maria Elena Lira APRN - CNM        Or   • lactated ringers bolus 500 mL  500 mL IntraVENous PRN Maria Elena Lira APRN - CNM       • ondansetron (ZOFRAN) injection 4 mg  4 mg IntraVENous Q6H PRN Soraya

## 2024-11-02 NOTE — PROGRESS NOTES
Labor Progress Note     Patient: Ligia Dumont MRN: [id number]  SSN: [id number]    YOB: 1990  Age: 34 y.o.  Sex: female        Subjective:   Patient evaluated and is coping well with contractions.  Objective:   Blood pressure 119/69, pulse 85, temperature 97.5 °F (36.4 °C), temperature source Oral, resp. rate 12, SpO2 98%.    Sterile Vaginal Exam: 3/50/-3  Membranes:  intact  EFM:  - Fetal Heart Rate: Baseline Rate: 125 bpm    - Uterine Activity: irritability , irregular   -pit: 4 mu/min        Assessment:    SIUP @ 39w0d   IOL for CHTN   GBS +  Category 1 fetal heart rate tracing     Plan:   S/P cook balloon   Continue pitocin titration to achieve contractions every 2-3 min   All questions answered, and pt/partner agree with plan of care  Anticipate       Signed By:  SARA Horton CNM      2024 4:49 AM

## 2024-11-02 NOTE — PROGRESS NOTES
CNM Labor Progress Note     Patient: Ligia Dumont MRN: [id number]  SSN: [id number]    YOB: 1990  Age: 34 y.o.  Sex: female        Subjective:   Patient is starting to have difficulty with breathing with her contractions. Pt is open to having Nitrous Oxide.   Pt c/o of back pain and rectal pressure.       Objective:   Blood pressure 135/78, pulse 97, temperature 97.7 °F (36.5 °C), temperature source Axillary, resp. rate 20, SpO2 98%.    Fetal heart baseline , minimal or moderate or marked variability, accelerations present/ not present, decelerations present /not present, Uterine contractions q minutes, mild or moderate or strong to palpation, resting tone soft.    Sterile Vaginal Exam: 4 cm dilated/ 70% effaced/ -2 station, cervix posterior , fetal presentation vertex, membranes ruptured     Bedside ultrasound shows fetal position is LOP.    Assessment:     39w1d  Category 1 fetal heart rate tracing   Early Labor   AROM - 9 am today - clear fluid.   Afebirle     Hx of CHTN -takes Amlodipine - takes 10 mg po daily.     GBS: Positive     Maternal Obesity     Hx of HSV2 - takes Valtrex 500 mg po BID. No active outbreaks or lesion.     Iron Deficiency Anemia in pregnancy   AMA.     Plan:   Continue current orders/management     IV pitocin at 1 milliunits/min     Pt is assisted on inversion position , and to sustain it for 15 min, and then go to hands and knees while hip swaying.     Nitrous Oxide  to be started.     IV PCN q 4 hours.     Reassess pt in 4 hours, or sooner prn.     CNM management   Anticipate     SARA Hutchison CNM

## 2024-11-02 NOTE — PROGRESS NOTES
Labor Progress Note     Patient: Ligia Dumont MRN: [id number]  SSN: [id number]    YOB: 1990  Age: 34 y.o.  Sex: female        Subjective:   Patient coping well with contractions. Pt resting comfortably. Tolerating contractions . No complaints at this time.     Objective:   Patient Vitals for the past 4 hrs:   Temp Pulse BP SpO2   24 0715 98.1 °F (36.7 °C) 90 (!) 100/59 98 %   24 0513 -- 98 (!) 101/57 98 %         Fetal Assessment  Baseline:  155 bpm  Variability: Moderate (6-25 bpm)  Accelerations: Present  Decelerations: Absent       Contractions:  [place]: q 2-3 mins    Pelvic/Vaginal Exam  SVE: /-2  Amniotomy  @ 0900 with amnihook was performed without difficulty, baby's head was well engaged, moderate amount of clear fluid noted.   IUPC placed w/out difficulty       Lab Results   Component Value Date    WBC 12.1 (H) 10/31/2024    HGB 9.7 (L) 10/31/2024    HCT 30.0 (L) 10/31/2024    MCV 82.4 10/31/2024     10/31/2024     Lab Results   Component Value Date/Time     2024 05:56 PM    K 3.9 2024 05:56 PM     2024 05:56 PM    CO2 22 2024 05:56 PM    BUN 6 2024 05:56 PM    CREATININE 0.59 2024 05:56 PM    GLUCOSE 67 2024 05:56 PM    CALCIUM 9.2 2024 05:56 PM    LABGLOM >90 2024 05:56 PM        Assessment/Plan:   34 y.o.  IUP at 39w1d. Pregnancy complicated by cHTN, HSV(on valtrex).   Category I tracing    1. Labor/Augmentation   - Labor course : 1/thick/high > cytotec > CRB 80/80 > 3/50/-2 > 4/50/-2. Discussed AROM. Pt agreed. AROM,clear @0900. IUPC placed    - Pitocin. Titrate as tolerated and per protocol   - Monitor VS   - GBS positive. Ampicillin ordered    - Continuous fetal monitoring and toco   - Anticipate vaginal delivery   - Pain: epidural available per patient request     Mely Greenwood MD   [facility]

## 2024-11-02 NOTE — PROGRESS NOTES
1920 Report received from WILEY Ritter RN at the bedside.Pt comfortable with epidural recently placed. Oxytocin gtt infusing. Vitals stable. Assessment completed by this RN.    2110 KARLY Lira CNM at the bedside. Bedside US performed and SVE. Cvx 4/90/-1. POC discussed. Will reposition on peanut ball, titrate up on oxytocin, reexamine in 2 hours. Pt agrees with plan.    0135 SVE by KARLY Lira CNM. Cvx 5/80/-1. POC discussed. Pt and  discussing proceeding with a c/s at this time.    *Fall back time*    0107 Dr. Andrew at the bedside. SVE performed. POC discussed. Pt agees to proceed with C/S at this time.     0515 TRANSFER - OUT REPORT:    Verbal report given to DIAMOND Mattson RN on Ligia Dumont  for routine progression of patient care       Report consisted of patient's Situation, Background, Assessment and   Recommendations(SBAR).     Information from the following report(s) Nurse Handoff Report, Surgery Report, Intake/Output, MAR, Recent Results, and Quality Measures was reviewed with the receiving nurse.           Lines:   Peripheral IV 11/02/24 Left;Posterior Hand (Active)   Site Assessment Clean, dry & intact 11/02/24 2020   Line Status Infusing 11/02/24 2020   Phlebitis Assessment No symptoms 11/02/24 2020   Infiltration Assessment 0 11/02/24 2020   Dressing Status Clean, dry & intact 11/02/24 2020   Dressing Type Transparent 11/02/24 2020        Opportunity for questions and clarification was provided.

## 2024-11-02 NOTE — PROGRESS NOTES
0715 Assumed care of patient. Received resting in bed, significant other at bedside. Tolerating ctx, reports as mild cramps. Denies H/A, visual disturbances, N/V, epigastric pain. Trace edema to BLE. Labor education provided. Patient desires no epidural. Increased activity encouraged as tolerated.    0900 Dr. Greenwood to bedside, patient consent to SVE. AROM clear moderate fluid. IUPC placed.    1300 Patient up and active frequently at bedside. Plans to use nitrous when ready.    1340 MEENA Arredondo to bedside, patient consent to SVE. POC discussed.    1349 Maria Elena ROBLEDO performing bedside ultrasound.    1405 Assisted in inversion position with intermittent use of nitrous, unable to tolerate over 15 minutes. Repositioned partial side lying.    1908 Sitting up to side of bed for epidural placement. Time out performed.    1913 Test dose.    1918 Repositioned supine s/p epidural, efm/toco adjusted.    1930 Bedside report given to Allison LAROSE by Lupe LAROSE.

## 2024-11-02 NOTE — ANESTHESIA PROCEDURE NOTES
Epidural Block    Patient location during procedure: OB  Start time: 11/2/2024 7:08 PM  End time: 11/2/2024 7:13 PM  Reason for block: labor epidural  Staffing  Anesthesiologist: Minh Parra MD  Performed by: Minh Parra MD  Authorized by: Minh Parra MD    Epidural  Patient position: sitting  Prep: Betadine  Patient monitoring: frequent blood pressure checks and continuous pulse ox  Approach: midline  Location: L3-4  Injection technique: KAYCEE air  Provider prep: mask and sterile gloves  Needle  Needle type: Tuohy   Needle gauge: 17 G  Needle length: 3.5 in  Needle insertion depth: 7 cm  Catheter type: multi-orifice  Catheter size: 20 G  Catheter at skin depth: 12 cm  Test dose: negativeCatheter Secured: tegaderm and tape  Assessment  Hemodynamics: stable  Attempts: 1  Outcomes: patient tolerated procedure well  Additional Notes  Pt voiced immediate relief   Tolerated well  PCEA explained AVU   See TD for BP/HR/Sats/FHT all ok   Preanesthetic Checklist  Completed: patient identified, IV checked, site marked, risks and benefits discussed, surgical/procedural consents, equipment checked, pre-op evaluation, timeout performed, anesthesia consent given, oxygen available, monitors applied/VS acknowledged, fire risk safety assessment completed and verbalized and blood product R/B/A discussed and consented

## 2024-11-02 NOTE — PROGRESS NOTES
Labor Progress Note     Patient: Ligia Dumont MRN: [id number]  SSN: [id number]    YOB: 1990  Age: 34 y.o.  Sex: female        Subjective:   Patient evaluated and is coping well with cramping.   Objective:   Blood pressure 132/69, pulse 87, temperature 98 °F (36.7 °C), temperature source Oral, resp. rate 14, SpO2 98%.    Sterile Vaginal Exam: - cook still in place, likely 3 cm   Membranes:  intact   EFM:  - Fetal Heart Rate: Baseline Rate: 130 bpm    - Uterine Activity: irregular, irritability        Assessment:    SIUP @ 39w0d   IOL for CHTN   GBS +  Category 1 fetal heart rate tracing   Plan:   Cook still in place after 12 hours, 60ml/20ml (vaginal deflated earlier due to patient not tolerating/request)  1 dose cytotec received earlier and caused tachysystole   Recommended leaving cook  in for another 4 hours  Discussed expectations with pain in labor including pain management options including IV pain meds/epidural.   Patient desires unmedicated birth at this time   Risks/benefits pitocin discussed. Recommended beginning pitocin at 2 am when cook balloon removed. Patient is hesitant as she wants her body to progress on its own.   All questions answered    Signed By:  SARA Horton CNM      2024 10:47 PM

## 2024-11-03 PROCEDURE — 6360000002 HC RX W HCPCS: Performed by: NURSE ANESTHETIST, CERTIFIED REGISTERED

## 2024-11-03 PROCEDURE — 6370000000 HC RX 637 (ALT 250 FOR IP): Performed by: ADVANCED PRACTICE MIDWIFE

## 2024-11-03 PROCEDURE — 3700000001 HC ADD 15 MINUTES (ANESTHESIA): Performed by: OBSTETRICS & GYNECOLOGY

## 2024-11-03 PROCEDURE — 6360000002 HC RX W HCPCS: Performed by: OBSTETRICS & GYNECOLOGY

## 2024-11-03 PROCEDURE — 7100000000 HC PACU RECOVERY - FIRST 15 MIN: Performed by: OBSTETRICS & GYNECOLOGY

## 2024-11-03 PROCEDURE — 2580000003 HC RX 258: Performed by: ADVANCED PRACTICE MIDWIFE

## 2024-11-03 PROCEDURE — 6360000002 HC RX W HCPCS: Performed by: ADVANCED PRACTICE MIDWIFE

## 2024-11-03 PROCEDURE — 2500000003 HC RX 250 WO HCPCS: Performed by: NURSE ANESTHETIST, CERTIFIED REGISTERED

## 2024-11-03 PROCEDURE — 1120000000 HC RM PRIVATE OB

## 2024-11-03 PROCEDURE — 6370000000 HC RX 637 (ALT 250 FOR IP): Performed by: OBSTETRICS & GYNECOLOGY

## 2024-11-03 PROCEDURE — 3609079900 HC CESAREAN SECTION: Performed by: OBSTETRICS & GYNECOLOGY

## 2024-11-03 PROCEDURE — 51702 INSERT TEMP BLADDER CATH: CPT

## 2024-11-03 PROCEDURE — 2580000003 HC RX 258: Performed by: OBSTETRICS & GYNECOLOGY

## 2024-11-03 PROCEDURE — 3700000000 HC ANESTHESIA ATTENDED CARE: Performed by: OBSTETRICS & GYNECOLOGY

## 2024-11-03 PROCEDURE — 7100000001 HC PACU RECOVERY - ADDTL 15 MIN: Performed by: OBSTETRICS & GYNECOLOGY

## 2024-11-03 RX ORDER — ONDANSETRON 4 MG/1
4 TABLET, ORALLY DISINTEGRATING ORAL EVERY 8 HOURS PRN
Status: DISCONTINUED | OUTPATIENT
Start: 2024-11-03 | End: 2024-11-05 | Stop reason: HOSPADM

## 2024-11-03 RX ORDER — SODIUM CHLORIDE 0.9 % (FLUSH) 0.9 %
5-40 SYRINGE (ML) INJECTION PRN
Status: DISCONTINUED | OUTPATIENT
Start: 2024-11-03 | End: 2024-11-05 | Stop reason: HOSPADM

## 2024-11-03 RX ORDER — LANOLIN/MINERAL OIL
LOTION (ML) TOPICAL
Status: DISCONTINUED | OUTPATIENT
Start: 2024-11-03 | End: 2024-11-05 | Stop reason: HOSPADM

## 2024-11-03 RX ORDER — ACETAMINOPHEN 500 MG
1000 TABLET ORAL EVERY 8 HOURS SCHEDULED
Status: DISCONTINUED | OUTPATIENT
Start: 2024-11-03 | End: 2024-11-05 | Stop reason: HOSPADM

## 2024-11-03 RX ORDER — SODIUM CHLORIDE, SODIUM LACTATE, POTASSIUM CHLORIDE, CALCIUM CHLORIDE 600; 310; 30; 20 MG/100ML; MG/100ML; MG/100ML; MG/100ML
INJECTION, SOLUTION INTRAVENOUS CONTINUOUS
Status: DISCONTINUED | OUTPATIENT
Start: 2024-11-03 | End: 2024-11-05 | Stop reason: HOSPADM

## 2024-11-03 RX ORDER — FENTANYL CITRATE 50 UG/ML
INJECTION, SOLUTION INTRAMUSCULAR; INTRAVENOUS
Status: DISCONTINUED | OUTPATIENT
Start: 2024-11-03 | End: 2024-11-03 | Stop reason: SDUPTHER

## 2024-11-03 RX ORDER — MORPHINE SULFATE 1 MG/ML
INJECTION, SOLUTION EPIDURAL; INTRATHECAL; INTRAVENOUS
Status: DISCONTINUED | OUTPATIENT
Start: 2024-11-03 | End: 2024-11-03 | Stop reason: SDUPTHER

## 2024-11-03 RX ORDER — OXYCODONE HYDROCHLORIDE 5 MG/1
10 TABLET ORAL EVERY 4 HOURS PRN
Status: DISCONTINUED | OUTPATIENT
Start: 2024-11-03 | End: 2024-11-05 | Stop reason: HOSPADM

## 2024-11-03 RX ORDER — OXYTOCIN 10 [USP'U]/ML
INJECTION, SOLUTION INTRAMUSCULAR; INTRAVENOUS
Status: DISCONTINUED | OUTPATIENT
Start: 2024-11-03 | End: 2024-11-03 | Stop reason: SDUPTHER

## 2024-11-03 RX ORDER — DIPHENHYDRAMINE HYDROCHLORIDE 50 MG/ML
25 INJECTION INTRAMUSCULAR; INTRAVENOUS EVERY 6 HOURS PRN
Status: DISCONTINUED | OUTPATIENT
Start: 2024-11-03 | End: 2024-11-05 | Stop reason: HOSPADM

## 2024-11-03 RX ORDER — SODIUM CHLORIDE 0.9 % (FLUSH) 0.9 %
5-40 SYRINGE (ML) INJECTION EVERY 12 HOURS SCHEDULED
Status: DISCONTINUED | OUTPATIENT
Start: 2024-11-03 | End: 2024-11-05 | Stop reason: HOSPADM

## 2024-11-03 RX ORDER — ONDANSETRON 2 MG/ML
4 INJECTION INTRAMUSCULAR; INTRAVENOUS EVERY 6 HOURS PRN
Status: DISCONTINUED | OUTPATIENT
Start: 2024-11-03 | End: 2024-11-05 | Stop reason: HOSPADM

## 2024-11-03 RX ORDER — FAMOTIDINE 10 MG/ML
INJECTION, SOLUTION INTRAVENOUS
Status: DISCONTINUED | OUTPATIENT
Start: 2024-11-03 | End: 2024-11-03 | Stop reason: SDUPTHER

## 2024-11-03 RX ORDER — IBUPROFEN 800 MG/1
800 TABLET, FILM COATED ORAL EVERY 8 HOURS
Status: DISCONTINUED | OUTPATIENT
Start: 2024-11-04 | End: 2024-11-05 | Stop reason: HOSPADM

## 2024-11-03 RX ORDER — ONDANSETRON 2 MG/ML
INJECTION INTRAMUSCULAR; INTRAVENOUS
Status: DISCONTINUED | OUTPATIENT
Start: 2024-11-03 | End: 2024-11-03 | Stop reason: SDUPTHER

## 2024-11-03 RX ORDER — DOCUSATE SODIUM 100 MG/1
100 CAPSULE, LIQUID FILLED ORAL 2 TIMES DAILY
Status: DISCONTINUED | OUTPATIENT
Start: 2024-11-03 | End: 2024-11-05 | Stop reason: HOSPADM

## 2024-11-03 RX ORDER — KETOROLAC TROMETHAMINE 30 MG/ML
30 INJECTION, SOLUTION INTRAMUSCULAR; INTRAVENOUS EVERY 6 HOURS
Status: DISPENSED | OUTPATIENT
Start: 2024-11-03 | End: 2024-11-04

## 2024-11-03 RX ORDER — HYDROMORPHONE HYDROCHLORIDE 1 MG/ML
1 INJECTION, SOLUTION INTRAMUSCULAR; INTRAVENOUS; SUBCUTANEOUS EVERY 4 HOURS PRN
Status: DISCONTINUED | OUTPATIENT
Start: 2024-11-03 | End: 2024-11-05 | Stop reason: HOSPADM

## 2024-11-03 RX ORDER — SODIUM CHLORIDE 9 MG/ML
INJECTION, SOLUTION INTRAVENOUS PRN
Status: DISCONTINUED | OUTPATIENT
Start: 2024-11-03 | End: 2024-11-05 | Stop reason: HOSPADM

## 2024-11-03 RX ORDER — LIDOCAINE HCL/EPINEPHRINE/PF 2%-1:200K
VIAL (ML) INJECTION
Status: DISCONTINUED | OUTPATIENT
Start: 2024-11-03 | End: 2024-11-03 | Stop reason: SDUPTHER

## 2024-11-03 RX ORDER — DEXAMETHASONE SODIUM PHOSPHATE 4 MG/ML
INJECTION, SOLUTION INTRA-ARTICULAR; INTRALESIONAL; INTRAMUSCULAR; INTRAVENOUS; SOFT TISSUE
Status: DISCONTINUED | OUTPATIENT
Start: 2024-11-03 | End: 2024-11-03 | Stop reason: SDUPTHER

## 2024-11-03 RX ADMIN — FAMOTIDINE 20 MG: 10 INJECTION, SOLUTION INTRAVENOUS at 01:50

## 2024-11-03 RX ADMIN — DOCUSATE SODIUM 100 MG: 100 CAPSULE, LIQUID FILLED ORAL at 20:44

## 2024-11-03 RX ADMIN — MORPHINE SULFATE 3 MG: 1 INJECTION, SOLUTION EPIDURAL; INTRATHECAL; INTRAVENOUS at 01:43

## 2024-11-03 RX ADMIN — KETOROLAC TROMETHAMINE 30 MG: 30 INJECTION, SOLUTION INTRAMUSCULAR at 20:44

## 2024-11-03 RX ADMIN — ONDANSETRON 4 MG: 2 INJECTION INTRAMUSCULAR; INTRAVENOUS at 01:50

## 2024-11-03 RX ADMIN — AZITHROMYCIN DIHYDRATE 500 MG: 500 INJECTION, POWDER, LYOPHILIZED, FOR SOLUTION INTRAVENOUS at 01:56

## 2024-11-03 RX ADMIN — SODIUM CHLORIDE 2.5 MILLION UNITS: 9 INJECTION, SOLUTION INTRAVENOUS at 01:36

## 2024-11-03 RX ADMIN — KETOROLAC TROMETHAMINE 30 MG: 30 INJECTION, SOLUTION INTRAMUSCULAR at 10:33

## 2024-11-03 RX ADMIN — KETOROLAC TROMETHAMINE 30 MG: 30 INJECTION, SOLUTION INTRAMUSCULAR at 03:58

## 2024-11-03 RX ADMIN — CEFAZOLIN SODIUM 2000 MG: 1 POWDER, FOR SOLUTION INTRAMUSCULAR; INTRAVENOUS at 01:49

## 2024-11-03 RX ADMIN — AMLODIPINE BESYLATE 10 MG: 5 TABLET ORAL at 20:45

## 2024-11-03 RX ADMIN — ACETAMINOPHEN 1000 MG: 500 TABLET ORAL at 16:20

## 2024-11-03 RX ADMIN — OXYTOCIN 40 ML: 10 INJECTION, SOLUTION INTRAMUSCULAR; INTRAVENOUS at 02:06

## 2024-11-03 RX ADMIN — FENTANYL CITRATE 100 MCG: 50 INJECTION, SOLUTION INTRAMUSCULAR; INTRAVENOUS at 01:43

## 2024-11-03 RX ADMIN — DEXAMETHASONE SODIUM PHOSPHATE 8 MG: 4 INJECTION INTRA-ARTICULAR; INTRALESIONAL; INTRAMUSCULAR; INTRAVENOUS; SOFT TISSUE at 02:15

## 2024-11-03 RX ADMIN — LIDOCAINE HYDROCHLORIDE,EPINEPHRINE BITARTRATE 15 ML: 20; .005 INJECTION, SOLUTION EPIDURAL; INFILTRATION; INTRACAUDAL; PERINEURAL at 01:43

## 2024-11-03 ASSESSMENT — PAIN DESCRIPTION - DESCRIPTORS
DESCRIPTORS: SORE
DESCRIPTORS: SORE
DESCRIPTORS: CRAMPING;ACHING

## 2024-11-03 ASSESSMENT — PAIN DESCRIPTION - ORIENTATION
ORIENTATION: LOWER

## 2024-11-03 ASSESSMENT — PAIN DESCRIPTION - LOCATION
LOCATION: ABDOMEN;INCISION
LOCATION: PERINEUM;INCISION
LOCATION: ABDOMEN
LOCATION: ABDOMEN

## 2024-11-03 ASSESSMENT — PAIN SCALES - GENERAL
PAINLEVEL_OUTOF10: 5
PAINLEVEL_OUTOF10: 2
PAINLEVEL_OUTOF10: 2
PAINLEVEL_OUTOF10: 4

## 2024-11-03 NOTE — PROGRESS NOTES
[facility] Labor Progress Note     Patient: Ligia Dumont MRN: [id number]  SSN: [id number]    YOB: 1990  Age: 34 y.o.  Sex: female      1:15 am ( Pre- Daylight's Saving Time)  Subjective:   Patient reports she is feeling pelvic pressure even though she has an epidural.       Objective:   Blood pressure (!) 145/67, pulse (!) 128, temperature 98.3 °F (36.8 °C), resp. rate 17, SpO2 99%.    Fetal heart baseline 145 , moderate  variability, accelerations present, decelerations not present, Uterine contractions q 2- 3 minutes, strong to palpation, resting tone soft. MVU's are adequate.    Sterile Vaginal Exam: 5 cm dilated/ 80-90 % effaced/ -1 station, cervix  midline , fetal presentation vertex, membranes  ruptured       Assessment:     39w2d  Category 1 fetal heart rate tracing   Early Labor     Afebirle - after having taken Tylenol po for a low grade fever,       Hx of CHTN -takes Amlodipine - takes 10 mg po daily.      GBS: Positive      Maternal Obesity      Hx of HSV2 - Had taken Valtrex 500 mg po BID. No active outbreaks or lesion.      Iron Deficiency Anemia in pregnancy    AMA.     Plan:   Continue current orders/management     Reviewed with pt that she has not had adequate cervical change in the last 3 hours. Reviewed with pt my recommendation of a .     Pt desires to talk to her  privately and call her mother to discuss her options, and then she would like to talk to Dr. Andrew ([facility] Hospitalist) to review Pro's and Con's of a .     IV Pitocin q 4 hours had been given for Prophylaxis.       SARA Hutchison - MEENA

## 2024-11-03 NOTE — OP NOTE
Operative Note    Name: Ligia Dumont   Medical Record Number: [id number]   YOB: 1990  Today's Date: November 3, 2024      Pre-operative Diagnosis: Failure to progress in labor [O62.2]  Fetal intolerance to labor, delivered, current hospitalization [O77.9]    Post-operative Diagnosis: Same  Operation: low transverse  section Procedure(s):   SECTION    Surgeon(s):  Orlando Andrew Jr., MD    Anesthesia: Epidural    EBL: 540cc    Prophylactic Antibiotics: Ancef  DVT Prophylaxis: Sequential Compression Devices         Fetal Description: sabillon     Birth Information:   Information for the patient's :  Grecia Dumont [[id number]]   @[id number]@    Umbilical Cord: 3 vessels present    Placenta:  manual removal    Specimens: placenta           Complications:  none    Implants: none    Scrub Person First: Ermelinda Brown      Procedure Detail:      After proper patient identification and consent, the patient was taken to the operating room, where epidural anesthesia was administered and found to be adequate. Kim catheter had been placed using sterile technique.  The patient was prepped and draped in the normal sterile fashion.The abdomen was entered using the Pfannenstiel technique. The peritoneum was entered bluntely well superior to the bladder without any apparent injury. An Esa retractor was placed.  Palpation revealed no bowel below the retractor. The bladder flap was created without difficulty. A low transverse uterine incision was made with the scalpel and extended with blunt finger dissection. Amniotomy was performed and the fluid was small amount clear.  The female baby’s head was then delivered atraumatically. The nose and mouth were suctioned. The cord was clamped and cut and the baby was handed off to Nursing staff in attendance. The placenta was expressed. The uterus was wiped clean with a moist lap pad and cleared of all clots and debris.

## 2024-11-03 NOTE — PROGRESS NOTES
This is a 35 y/o F  at 39 weeks 2 days, in her 3rd day of induction now has  developed failure to progress with her cervix very swollen and unchanged for the past 4 hours. The baby is also having a CAT II tracing with repeative variables with every contraction. The patient was explained the indications for her  and has agreed to comply with this management plan.

## 2024-11-04 LAB
ERYTHROCYTE [DISTWIDTH] IN BLOOD BY AUTOMATED COUNT: 16 % (ref 11.5–14.5)
HCT VFR BLD AUTO: 25 % (ref 35–47)
HGB BLD-MCNC: 7.9 G/DL (ref 11.5–16)
MCH RBC QN AUTO: 26.3 PG (ref 26–34)
MCHC RBC AUTO-ENTMCNC: 31.6 G/DL (ref 30–36.5)
MCV RBC AUTO: 83.3 FL (ref 80–99)
NRBC # BLD: 0 K/UL (ref 0–0.01)
NRBC BLD-RTO: 0 PER 100 WBC
PLATELET # BLD AUTO: 256 K/UL (ref 150–400)
PMV BLD AUTO: 8.8 FL (ref 8.9–12.9)
RBC # BLD AUTO: 3 M/UL (ref 3.8–5.2)
T PALLIDUM AB SER QL IA: NON REACTIVE
WBC # BLD AUTO: 16.2 K/UL (ref 3.6–11)

## 2024-11-04 PROCEDURE — 6370000000 HC RX 637 (ALT 250 FOR IP): Performed by: ADVANCED PRACTICE MIDWIFE

## 2024-11-04 PROCEDURE — 36415 COLL VENOUS BLD VENIPUNCTURE: CPT

## 2024-11-04 PROCEDURE — 85027 COMPLETE CBC AUTOMATED: CPT

## 2024-11-04 PROCEDURE — 1120000000 HC RM PRIVATE OB

## 2024-11-04 PROCEDURE — 6370000000 HC RX 637 (ALT 250 FOR IP): Performed by: OBSTETRICS & GYNECOLOGY

## 2024-11-04 RX ADMIN — ACETAMINOPHEN 1000 MG: 500 TABLET ORAL at 02:00

## 2024-11-04 RX ADMIN — OXYCODONE 5 MG: 5 TABLET ORAL at 20:11

## 2024-11-04 RX ADMIN — AMLODIPINE BESYLATE 10 MG: 5 TABLET ORAL at 20:54

## 2024-11-04 RX ADMIN — OXYCODONE 5 MG: 5 TABLET ORAL at 10:48

## 2024-11-04 RX ADMIN — IBUPROFEN 800 MG: 800 TABLET, FILM COATED ORAL at 20:58

## 2024-11-04 RX ADMIN — DOCUSATE SODIUM 100 MG: 100 CAPSULE, LIQUID FILLED ORAL at 20:54

## 2024-11-04 RX ADMIN — IBUPROFEN 800 MG: 800 TABLET, FILM COATED ORAL at 13:07

## 2024-11-04 RX ADMIN — IBUPROFEN 800 MG: 800 TABLET, FILM COATED ORAL at 05:07

## 2024-11-04 RX ADMIN — ACETAMINOPHEN 1000 MG: 500 TABLET ORAL at 09:39

## 2024-11-04 RX ADMIN — OXYCODONE 5 MG: 5 TABLET ORAL at 16:50

## 2024-11-04 RX ADMIN — OXYCODONE HYDROCHLORIDE 5 MG: 5 TABLET ORAL at 06:31

## 2024-11-04 RX ADMIN — ACETAMINOPHEN 1000 MG: 500 TABLET ORAL at 16:51

## 2024-11-04 RX ADMIN — DOCUSATE SODIUM 100 MG: 100 CAPSULE, LIQUID FILLED ORAL at 09:39

## 2024-11-04 ASSESSMENT — PAIN SCALES - GENERAL
PAINLEVEL_OUTOF10: 3
PAINLEVEL_OUTOF10: 5
PAINLEVEL_OUTOF10: 5

## 2024-11-04 ASSESSMENT — PAIN DESCRIPTION - DESCRIPTORS
DESCRIPTORS: ACHING;SORE
DESCRIPTORS: SORE

## 2024-11-04 ASSESSMENT — PAIN DESCRIPTION - LOCATION
LOCATION: ABDOMEN;INCISION
LOCATION: ABDOMEN;INCISION

## 2024-11-04 ASSESSMENT — PAIN DESCRIPTION - ORIENTATION
ORIENTATION: LOWER
ORIENTATION: LOWER

## 2024-11-04 NOTE — CARE COORDINATION
2024  2:57 PM    Care Management Progress Note    Reason for Admission:   Encounter for induction of labor [Z34.90]  Procedure(s) (LRB):   SECTION (N/A)  1 Day Post-Op    Patient Admission Status: Inpatient    Transition of care plan:    CM met with ANTONIO to complete initial assessment and begin discharge planning.  MOB verified and confirmed demographics.  ANTONIO lives with FOB, at the address on file. ANTONIO reports she has good family support, and feels like she has the support she needs when she returns home.  ANTONIO plans to breast feed baby and has pump to use at home.  [facility] will provide follow up care for infant. ANTONIO has car seat, bassinet/crib, clothing, bottles and all necessary supplies for baby.        [phone]   Service Assessment   Patient Orientation Alert and Oriented   Cognition Alert   History Provided By Patient   Primary Caregiver Self   Support Systems Spouse/Significant Other   PCP Verified by CM Yes   Last Visit to PCP Within last 3 months   Prior Functional Level Independent in ADLs/IADLs   Current Functional Level Independent in ADLs/IADLs   Can patient return to prior living arrangement Yes   Ability to make needs known: Good   Family able to assist with home care needs: Yes   Would you like for me to discuss the discharge plan with any other family members/significant others, and if so, who? No   Financial Resources Other (Comment)     Esa Paul CM

## 2024-11-04 NOTE — LACTATION NOTE
This note was copied from a baby's chart.  Mother will successfully establish breastfeeding by feeding in response to early feeding cues   or wake every 3h, will obtain deep latch, and will keep log of feedings/output.  Taught to BF at hunger cues and or q 2-3 hrs and to offer 10-20 drops of hand expressed colostrum at any non-feeds.      Left Breast: Soft  Left Nipple: Protrude  Right Nipple: Protrude  Right Breast: Soft  Position and Latch: Independently, Good technique  Signs of Transfer: Audible infant swallows, Nutritive sucking  Maternal Response: Attentive, Comfortable,  Comfortable with position           Latch: Grasps breast, tongue down, lips flanged, rhythmic sucking  Audible Swallowing: A few with stimulation  Type of Nipple: Everted (after stimulation)  Comfort (Breast/Nipple): Soft/non-tender  Hold (Positioning): No assist from staff, mother able to position/hold infant  LATCH Score: 9  Breast Care: Lanolin provided     Lactation Comment: Baby was breastfeeding well on left breast in cradle hold. Encouraged mother to position baby tummy to tummy and to get babys mouth opened wide prior to latching on.

## 2024-11-04 NOTE — LACTATION NOTE
This note was copied from a baby's chart.  This is mother's first baby. Mother states baby has been latching on well and breastfeeding well.     Discussed with mother her plan for feeding.  Reviewed the benefits of exclusive breast milk feeding during the hospital stay.   Informed her of the risks of using formula to supplement in the first few days of life as well as the benefits of successful breast milk feeding; referred her to the Breastfeeding booklet about this information.   She acknowledges understanding of information reviewed and states that it is her plan to breastfeed her infant.  Will support her choice and offer additional information as needed.       Encouraged mom to attempt feeding with baby led feeding cues. Just as sucking on fingers, rooting, mouthing.   Looking for 8-12 feedings in 24 hours.   Don't limit baby at breast, allow baby to come of breast on it's own. Baby may want to feed  often and may increase number of feedings on second day of life. Skin to skin encouraged.      If baby doesn't nurse,  Mom should  hand express  10-20 drops of colostrum and drip into baby's mouth, or give to baby by finger feeding, cup feeding, or spoon feeding at least every 2-3 hours.     Mother will successfully establish breastfeeding by feeding in response to early feeding cues   or wake every 3h, will obtain deep latch, and will keep log of feedings/output.  Taught to BF at hunger cues and or q 2-3 hrs and to offer 10-20 drops of hand expressed colostrum at any non-feeds.                                     Breast Care: Lanolin provided      Encouraged mother to call LC for breastfeeding assistance when baby is ready to feed.

## 2024-11-04 NOTE — PROGRESS NOTES
Post-Operative  Day 1    Ligia Dumont     Information for the patient's :  Tim Girl Ligia [[id number]]   , Low Transverse Patient doing well without significant complaint.  Tolerating PO w/out Nausea/vomiting.  No flatus, gray in place.    Vitals:    Vitals:    24 1022   BP: 121/76   Pulse: 83   Resp: 16   Temp: 97.5 °F (36.4 °C)   SpO2: 99%     Temp (24hrs), Av.5 °F (36.4 °C), Min:97.2 °F (36.2 °C), Max:97.8 °F (36.6 °C)      Intake and Output:   Current shift: No intake/output data recorded.  Last 3 completed shifts: 1901 -  0700  In: 350 [I.V.:100]  Out: 8010 [Urine:7450]    Exam:        Patient without distress.      Lungs clear.  Abdomen, bowel sounds present, soft, expected tenderness, fundus firm Wound intact     Perineum normal lochia noted               Lower extremities are negative for swelling or tenderness.    Labs:   Lab Results   Component Value Date/Time    WBC 16.2 2024 06:32 AM    WBC 12.1 10/31/2024 10:29 PM    WBC 13.2 10/28/2024 03:49 AM    WBC 10.7 2024 04:55 PM    WBC 11.4 2021 07:32 PM    HGB 7.9 2024 06:32 AM    HGB 9.7 10/31/2024 10:29 PM    HGB 9.8 10/28/2024 03:49 AM    HGB 9.3 2024 04:55 PM    HGB 12.7 2021 07:32 PM    HCT 25.0 2024 06:32 AM    HCT 30.0 10/31/2024 10:29 PM    HCT 29.7 10/28/2024 03:49 AM    HCT 28.3 2024 04:55 PM    HCT 39.2 2021 07:32 PM     2024 06:32 AM     10/31/2024 10:29 PM     10/28/2024 03:49 AM     2024 04:55 PM     2021 07:32 PM       Recent Results (from the past 24 hour(s))   CBC    Collection Time: 24  6:32 AM   Result Value Ref Range    WBC 16.2 (H) 3.6 - 11.0 K/uL    RBC 3.00 (L) 3.80 - 5.20 M/uL    Hemoglobin 7.9 (L) 11.5 - 16.0 g/dL    Hematocrit 25.0 (L) 35.0 - 47.0 %    MCV 83.3 80.0 - 99.0 FL    MCH 26.3 26.0 - 34.0 PG    MCHC 31.6 30.0 - 36.5 g/dL    RDW 16.0 (H) 11.5 - 14.5 %

## 2024-11-05 VITALS
SYSTOLIC BLOOD PRESSURE: 129 MMHG | TEMPERATURE: 97.3 F | OXYGEN SATURATION: 100 % | DIASTOLIC BLOOD PRESSURE: 80 MMHG | HEART RATE: 85 BPM | RESPIRATION RATE: 16 BRPM

## 2024-11-05 PROCEDURE — 6370000000 HC RX 637 (ALT 250 FOR IP): Performed by: OBSTETRICS & GYNECOLOGY

## 2024-11-05 RX ORDER — IBUPROFEN 800 MG/1
800 TABLET, FILM COATED ORAL EVERY 8 HOURS
Qty: 30 TABLET | Refills: 0 | Status: SHIPPED | OUTPATIENT
Start: 2024-11-05

## 2024-11-05 RX ORDER — OXYCODONE HYDROCHLORIDE 10 MG/1
10 TABLET ORAL EVERY 6 HOURS PRN
Qty: 12 TABLET | Refills: 0 | Status: SHIPPED | OUTPATIENT
Start: 2024-11-05 | End: 2024-11-08

## 2024-11-05 RX ORDER — PSEUDOEPHEDRINE HCL 30 MG
100 TABLET ORAL 2 TIMES DAILY
Qty: 60 CAPSULE | Refills: 2 | Status: SHIPPED | OUTPATIENT
Start: 2024-11-05

## 2024-11-05 RX ADMIN — OXYCODONE 5 MG: 5 TABLET ORAL at 08:30

## 2024-11-05 RX ADMIN — OXYCODONE 5 MG: 5 TABLET ORAL at 12:28

## 2024-11-05 RX ADMIN — OXYCODONE 5 MG: 5 TABLET ORAL at 00:28

## 2024-11-05 RX ADMIN — ACETAMINOPHEN 1000 MG: 500 TABLET ORAL at 00:28

## 2024-11-05 RX ADMIN — IBUPROFEN 800 MG: 800 TABLET, FILM COATED ORAL at 12:28

## 2024-11-05 RX ADMIN — IBUPROFEN 800 MG: 800 TABLET, FILM COATED ORAL at 04:48

## 2024-11-05 RX ADMIN — OXYCODONE 10 MG: 5 TABLET ORAL at 04:48

## 2024-11-05 RX ADMIN — DOCUSATE SODIUM 100 MG: 100 CAPSULE, LIQUID FILLED ORAL at 08:31

## 2024-11-05 RX ADMIN — ACETAMINOPHEN 1000 MG: 500 TABLET ORAL at 08:30

## 2024-11-05 ASSESSMENT — PAIN DESCRIPTION - DESCRIPTORS: DESCRIPTORS: ACHING;SORE

## 2024-11-05 ASSESSMENT — PAIN DESCRIPTION - LOCATION
LOCATION: ABDOMEN;INCISION
LOCATION: ABDOMEN;INCISION

## 2024-11-05 ASSESSMENT — PAIN DESCRIPTION - ORIENTATION
ORIENTATION: LOWER
ORIENTATION: LOWER

## 2024-11-05 ASSESSMENT — PAIN SCALES - GENERAL
PAINLEVEL_OUTOF10: 7
PAINLEVEL_OUTOF10: 7

## 2024-11-05 NOTE — LACTATION NOTE
This note was copied from a baby's chart.  Mother putting on make up in preparation for pictures and discharge.  Mother states breastfeeding is going well.  Mother asking questions about latching on both sides pumping and return to work.  Mothers questions answered.  Discharge info reviewed, printed info given.    Chart shows numerous feedings, void, stool WNL.  Discussed importance of monitoring outputs and feedings on first week of life.  Discussed ways to tell if baby is  getting enough breast milk, ie  voids and stools, change in color of stool, and return to birth wt within 2 weeks.  Follow up with pediatrician visit for weight check in 1-2 days (per AAP guidelines.)  Encouraged to call Warm Line  [phone]  for any questions/problems that arise. Mother also given breastfeeding support group dates and times for any future needs    Engorgement Care Guidelines:  Reviewed how milk is made and normal phases of milk production.  Taught care of engorged breasts - physiologic breastfeeding encouraged with use of cool packs (no ice directly on skin). Consider use of NSAIDS where appropriate for discomfort and inflammation. Can employ light touch, lymphatic drainage techniques on tender grandular tissues. Anticipatory guidance shared.    Pt will successfully establish breastfeeding by feeding in response to early feeding cues   or wake every 3h, will obtain deep latch, and will keep log of feedings/output.  Taught to BF at hunger cues and or q 2-3 hrs and to offer 10-20 drops of hand expressed colostrum at any non-feeds.      Left Breast: Soft  Left Nipple: Protrude  Right Nipple: Protrude  Right Breast: Soft  Position and Latch: Independently  Signs of Transfer: Audible infant swallows, Nutritive sucking  Maternal Response: Relaxed and confident           Latch:  (did not see baby at breast at this time)

## 2024-11-05 NOTE — PROGRESS NOTES
Post-Operative  Day 2    Ligia Dumont     Information for the patient's :  Tmi Girl Ligia [[id number]]   , Low Transverse Patient doing well without significant complaint. Tolerating diet, passing flatus, voiding and ambulating without difficulty    Vitals:    Vitals:    24 0632   BP: 102/67   Pulse: 82   Resp: 16   Temp: 97.9 °F (36.6 °C)   SpO2: 97%     Temp (24hrs), Av.8 °F (36.6 °C), Min:97.5 °F (36.4 °C), Max:97.9 °F (36.6 °C)        Exam:        Patient without distress.               Abdomen, bowel sounds present, soft, expected tenderness, fundus firm                Wound incision clean, dry and intact               Lower extremities are negative for swelling, cords or tenderness.    Labs:   Lab Results   Component Value Date/Time    WBC 16.2 2024 06:32 AM    WBC 12.1 10/31/2024 10:29 PM    WBC 13.2 10/28/2024 03:49 AM    WBC 10.7 2024 04:55 PM    WBC 11.4 2021 07:32 PM    HGB 7.9 2024 06:32 AM    HGB 9.7 10/31/2024 10:29 PM    HGB 9.8 10/28/2024 03:49 AM    HGB 9.3 2024 04:55 PM    HGB 12.7 2021 07:32 PM    HCT 25.0 2024 06:32 AM    HCT 30.0 10/31/2024 10:29 PM    HCT 29.7 10/28/2024 03:49 AM    HCT 28.3 2024 04:55 PM    HCT 39.2 2021 07:32 PM     2024 06:32 AM     10/31/2024 10:29 PM     10/28/2024 03:49 AM     2024 04:55 PM     2021 07:32 PM       No results found for this or any previous visit (from the past 24 hour(s)).    Assessment: Post-Op day 3, doing well      Plan:    Discharge home today    . Follow up in office in 2 weeks with Jason  . Post partum activity advised, diet as toleratedDischarge Medications: ibuprofen, percocet and medications prior to admission

## 2024-11-05 NOTE — DISCHARGE SUMMARY
Obstetrical Discharge Summary     Name: Ligia Dumont MRN: [id number]  SSN: [id number]    YOB: 1990  Age: 34 y.o.  Sex: female      Allergies: Patient has no known allergies.    Admit Date: 10/31/2024    Discharge Date: 2024     Admitting Physician: Orlando Andrew Jr., MD     Attending Physician:  Chapin Gomez MD     * Admission Diagnoses: Encounter for induction of labor [Z34.90]    * Discharge Diagnoses:   Information for the patient's :  Grecia Dumont [[id number]]   female   @BIRTHWEIGHT)@   Information for the patient's :  Grecia Dumont [[id number]]   11/3/2024  Information for the patient's :  Grecia Dumont [[id number]]   APGAR One: 9   Information for the patient's :  Grecia Dumont [[id number]]   APGAR Five: 9      Additional Diagnoses:   Principal Problem:    Encounter for induction of labor  Resolved Problems:    * No resolved hospital problems. *     Lab Results   Component Value Date/Time    ABORH O POSITIVE 10/31/2024 10:29 PM      There is no immunization history for the selected administration types on file for this patient.    * Procedures:   Procedure(s):   SECTION       * Discharge Condition: Good    * Hospital Course: Normal hospital course following the delivery.    * Disposition: Home    Discharge Medications:      Medication List        ASK your doctor about these medications      amLODIPine 10 MG tablet  Commonly known as: NORVASC     ferrous sulfate 325 (65 Fe) MG tablet  Commonly known as: IRON 325     metFORMIN 1000 MG tablet  Commonly known as: GLUCOPHAGE     valACYclovir 500 MG tablet  Commonly known as: VALTREX              * Follow-up Care/Patient Instructions:  Activity: ambulate in house, no lifting, Driving, or Strenuous exercise for 2weeks , and no driving while on analgesics  Diet: regular diet  Wound Care: keep wound clean and dry    No follow-up provider specified.

## 2024-11-05 NOTE — PROGRESS NOTES
Pt off unit in stable condition via wheelchair with volunteers for discharge home per Dr. Zapata. Pt is aware to follow-up in 2 week incision check & 6 weeks postpartum. Prescriptions given to pt. Pt denies any HA, dizziness, N/V or pain at this time. Infant in car seat and discharged with mother.

## 2024-11-05 NOTE — DISCHARGE INSTRUCTIONS
Section: What to Expect at Home  Your Recovery     A  section, or , is surgery to deliver your baby through a cut that the doctor makes in your lower belly and uterus. The cut is called an incision.  You may have some pain in your lower belly and need pain medicine for 1 to 2 weeks. You can expect some vaginal bleeding for several weeks. You will probably need about 6 weeks to fully recover.  It's important to take it easy while the incision heals. Avoid heavy lifting, strenuous activities, and exercises that strain the belly muscles while you recover. Ask a family member or friend for help with housework, cooking, and shopping.  This care sheet gives you a general idea about how long it will take for you to recover. But each person recovers at a different pace. Follow the steps below to get better as quickly as possible.  How can you care for yourself at home?  Activity    Rest when you feel tired. Getting enough sleep will help you recover.     Try to walk each day. Start by walking a little more than you did the day before. Bit by bit, increase the amount you walk. Walking boosts blood flow and helps prevent pneumonia, constipation, and blood clots.     Avoid strenuous activities, such as bicycle riding, jogging, weightlifting, and aerobic exercise, for 6 weeks or until your doctor says it is okay.     Until your doctor says it is okay, do not lift anything heavier than your baby.     Do not do sit-ups or other exercises that strain the belly muscles for 6 weeks or until your doctor says it is okay.     Hold a pillow over your incision when you cough or take deep breaths. This will support your belly and decrease your pain.     You may shower as usual. Pat the incision dry when you are done.     You will have some vaginal bleeding. Wear sanitary pads. Do not douche or use tampons until your doctor says it is okay.     Ask your doctor when you can drive again.     You will probably  need to take at least 6 weeks off work. It depends on the type of work you do and how you feel.     Ask your doctor when it is okay for you to have sex.   Diet    You can eat your normal diet. If your stomach is upset, try bland, low-fat foods like plain rice, broiled chicken, toast, and yogurt.     Drink plenty of fluids (unless your doctor tells you not to).     You may notice that your bowel movements are not regular right after your surgery. This is common. Try to avoid constipation and straining with bowel movements. You may want to take a fiber supplement every day. If you have not had a bowel movement after a couple of days, ask your doctor about taking a mild laxative.     If you are breastfeeding, limit alcohol. Alcohol can cause a lack of energy and other health problems for the baby when a breastfeeding woman drinks heavily. It can also get in the way of a mom's ability to feed her baby or to care for the child in other ways. There isn't a lot of research about exactly how much alcohol can harm a baby. Having no alcohol is the safest choice for your baby. If you choose to have a drink now and then, have only one drink, and limit the number of occasions that you have a drink. Wait to breastfeed at least 2 hours after you have a drink to reduce the amount of alcohol the baby may get in the milk.   Medicines    Your doctor will tell you if and when you can restart your medicines. You will also get instructions about taking any new medicines.     If you stopped taking aspirin or some other blood thinner, your doctor will tell you when to start taking it again.     Take pain medicines exactly as directed.  If the doctor gave you a prescription medicine for pain, take it as prescribed.  If you are not taking a prescription pain medicine, ask your doctor if you can take an over-the-counter medicine.     If you think your pain medicine is making you sick to your stomach:  Take your medicine after meals (unless